# Patient Record
Sex: MALE | Race: WHITE | NOT HISPANIC OR LATINO | Employment: OTHER | URBAN - METROPOLITAN AREA
[De-identification: names, ages, dates, MRNs, and addresses within clinical notes are randomized per-mention and may not be internally consistent; named-entity substitution may affect disease eponyms.]

---

## 2017-03-17 ENCOUNTER — TELEPHONE (OUTPATIENT)
Dept: FAMILY MEDICINE | Facility: CLINIC | Age: 72
End: 2017-03-17

## 2017-03-17 NOTE — TELEPHONE ENCOUNTER
Panel Management Review    Patient has the following on his problem list:     Diabetes    ASA: Passed    Last A1C  Lab Results   Component Value Date    A1C 9.2 12/14/2016    A1C 10.1 07/21/2016    A1C 8.0 03/23/2016    A1C 11.8 01/07/2016    A1C >15.0 06/26/2015     A1C tested: FAILED    Last LDL:    Lab Results   Component Value Date    CHOL 210 12/14/2016     Lab Results   Component Value Date    HDL 45 12/14/2016     Lab Results   Component Value Date     12/14/2016     02/18/2015     Lab Results   Component Value Date    TRIG 125 12/14/2016     Lab Results   Component Value Date    CHOLHDLRATIO 4.0 06/06/2013     Lab Results   Component Value Date    NHDL 165 12/14/2016     Is the patient on a Statin? YES             Is the patient on Aspirin? YES    Medications     HMG CoA Reductase Inhibitors    atorvastatin (LIPITOR) 20 MG tablet    Salicylates    aspirin 81 MG tablet        Last three blood pressure readings:  BP Readings from Last 3 Encounters:   12/14/16 130/81   07/21/16 136/84   02/17/16 134/76     Date of last diabetes office visit: 12/14/16     Tobacco History:     History   Smoking Status     Never Smoker   Smokeless Tobacco     Never Used     Hypertension   Last three blood pressure readings:  BP Readings from Last 3 Encounters:   12/14/16 130/81   07/21/16 136/84   02/17/16 134/76     Blood pressure: Passed    HTN Guidelines:  Age 18-59 BP range:  Less than 140/90  Age 60-85 with Diabetes:  Less than 140/90  Age 60-85 without Diabetes:  less than 150/90      Composite cancer screening  Chart review shows that this patient is due/due soon for the following Colonoscopy  Summary:    Patient is due/failing the following:   A1C, COLONOSCOPY and FOLLOW UP    Action needed:   Patient needs office visit for diabetes follow up (nonfasting).    Type of outreach:    Phone, spoke to patient.  apt scheduled for 3/27/17 at 2:20pm    Questions for provider review:    None                                                                                    Dian Claudio LPN     Chart routed to self for review.

## 2017-03-27 ENCOUNTER — OFFICE VISIT (OUTPATIENT)
Dept: FAMILY MEDICINE | Facility: CLINIC | Age: 72
End: 2017-03-27
Payer: COMMERCIAL

## 2017-03-27 VITALS
SYSTOLIC BLOOD PRESSURE: 136 MMHG | TEMPERATURE: 98.6 F | BODY MASS INDEX: 28.64 KG/M2 | HEIGHT: 69 IN | DIASTOLIC BLOOD PRESSURE: 88 MMHG | WEIGHT: 193.4 LBS | HEART RATE: 88 BPM

## 2017-03-27 DIAGNOSIS — Z23 NEED FOR PNEUMOCOCCAL VACCINE: Primary | ICD-10-CM

## 2017-03-27 DIAGNOSIS — Z79.4 TYPE 2 DIABETES MELLITUS WITH DIABETIC NEUROPATHY, WITH LONG-TERM CURRENT USE OF INSULIN (H): ICD-10-CM

## 2017-03-27 DIAGNOSIS — E11.40 TYPE 2 DIABETES MELLITUS WITH DIABETIC NEUROPATHY, WITH LONG-TERM CURRENT USE OF INSULIN (H): ICD-10-CM

## 2017-03-27 LAB
HBA1C MFR BLD: 8.7 % (ref 4.3–6)
TSH SERPL DL<=0.005 MIU/L-ACNC: 1.37 MU/L (ref 0.4–4)

## 2017-03-27 PROCEDURE — 84443 ASSAY THYROID STIM HORMONE: CPT | Performed by: FAMILY MEDICINE

## 2017-03-27 PROCEDURE — 83036 HEMOGLOBIN GLYCOSYLATED A1C: CPT | Performed by: FAMILY MEDICINE

## 2017-03-27 PROCEDURE — 99214 OFFICE O/P EST MOD 30 MIN: CPT | Mod: 25 | Performed by: FAMILY MEDICINE

## 2017-03-27 PROCEDURE — G0009 ADMIN PNEUMOCOCCAL VACCINE: HCPCS | Performed by: FAMILY MEDICINE

## 2017-03-27 PROCEDURE — 90732 PPSV23 VACC 2 YRS+ SUBQ/IM: CPT | Performed by: FAMILY MEDICINE

## 2017-03-27 PROCEDURE — 36415 COLL VENOUS BLD VENIPUNCTURE: CPT | Performed by: FAMILY MEDICINE

## 2017-03-27 NOTE — PROGRESS NOTES
SUBJECTIVE:                                                    Ramo Adams is a 72 year old male who presents to clinic today for the following health issues:    Diabetes Follow-up    Patient is checking blood sugars: once daily.  Results are as follows:             Diabetic concerns: None     Symptoms of hypoglycemia (low blood sugar): none     Paresthesias (numbness or burning in feet) or sores: No     Date of last diabetic eye exam: Many years ago     Hyperlipidemia Follow-Up      Rate your low fat/cholesterol diet?: not monitoring fat    Taking statin?  Yes, no muscle aches from statin    Other lipid medications/supplements?:  none     Hypertension Follow-up      Outpatient blood pressures are not being checked.    Low Salt Diet: not monitoring salt         Amount of exercise or physical activity: None    Problems taking medications regularly: No    Medication side effects: none    Diet: regular (no restrictions)      Problem list and histories reviewed & adjusted, as indicated.  Additional history:     Patient Active Problem List   Diagnosis     Health Care Home     Hyperlipidemia LDL goal <100     Overweight     Type 2 diabetes mellitus with diabetic neuropathy (H)     Essential hypertension with goal blood pressure less than 130/85     Past Surgical History:   Procedure Laterality Date     SPECIMEN TO PATHOLOGY  2008    colon resection - 6-7 inches       Social History   Substance Use Topics     Smoking status: Never Smoker     Smokeless tobacco: Never Used     Alcohol use Yes      Comment: very rare - 1 beer once a month or less     Family History   Problem Relation Age of Onset     DIABETES Mother      Alzheimer Disease Father      DIABETES Sister      DIABETES Maternal Grandmother            ROS:  Constitutional, HEENT, cardiovascular, pulmonary, gi and gu systems are negative, except as otherwise noted.    OBJECTIVE:                                                    /88 (BP Location: Right  "arm, Patient Position: Chair, Cuff Size: Adult Large)  Pulse 88  Temp 98.6  F (37  C) (Tympanic)  Ht 5' 8.5\" (1.74 m)  Wt 193 lb 6.4 oz (87.7 kg)  BMI 28.98 kg/m2 Body mass index is 28.98 kg/(m^2).   GENERAL: healthy, alert, well nourished, well hydrated, no distress  HENT: ear canals- normal; TMs- normal; Nose- normal; Mouth- no ulcers, no lesions  NECK: no tenderness, no adenopathy, no asymmetry, no masses, no stiffness; thyroid- normal to palpation  RESP: lungs clear to auscultation - no rales, no rhonchi, no wheezes  CV: regular rates and rhythm, normal S1 S2, no S3 or S4 and no murmur, no click or rub -  ABDOMEN: soft, no tenderness, no  hepatosplenomegaly, no masses, normal bowel sounds       ASSESSMENT/PLAN:                                                      (E11.40) Type 2 diabetes mellitus with diabetic neuropathy (H)  (primary encounter diagnosis)   Lab Results   Component Value Date    A1C 8.7 03/27/2017    A1C 9.2 12/14/2016    A1C 10.1 07/21/2016    A1C 8.0 03/23/2016    A1C 11.8 01/07/2016   Poor control     Plan: Hemoglobin A1c, TSH with free T4 reflex       (Z23) Need for pneumococcal vaccine  Plan: PNEUMOCOCCAL VACCINE,ADULT,SQ OR IM, ADMIN         PNEUMOCOCCAL VACCINE    (E11.40,  Z79.4) Type 2 diabetes mellitus with diabetic neuropathy, with long-term current use of insulin (H)  sheila increase lantus. Call me i 1 week with blod sugar resutls  Plan: insulin glargine (LANTUS SOLOSTAR) 100 UNIT/ML         injection     reports that he has never smoked. He has never used smokeless tobacco.    Patient should return to clinic for office visit in 6 months.    Santiago Ko MD    [facility]    "

## 2017-03-27 NOTE — MR AVS SNAPSHOT
"              After Visit Summary   3/27/2017    Ramo Adams    MRN: [id number]           Patient Information     Date Of Birth          1945        Visit Information        Provider Department      3/27/2017 2:20 PM Santiago Ko MD [facility]        Today's Diagnoses     Need for pneumococcal vaccine    -  1    Type 2 diabetes mellitus with diabetic neuropathy, with long-term current use of insulin (H)           Follow-ups after your visit        Who to contact     Normal or non-critical lab and imaging results will be communicated to you by [employer], letter or phone within 4 business days after the clinic has received the results. If you do not hear from us within 7 days, please contact the clinic through [employer] or phone. If you have a critical or abnormal lab result, we will notify you by phone as soon as possible.  Submit refill requests through "[employer] or call your pharmacy and they will forward the refill request to us. Please allow 3 business days for your refill to be completed.          If you need to speak with a  for additional information , please call: [phone]             Additional Information About Your Visit        "[employer] Information     "[employer] lets you send messages to your doctor, view your test results, renew your prescriptions, schedule appointments and more. To sign up, go to [email or web address] . Click on \"Log in\" on the left side of the screen, which will take you to the Welcome page. Then click on \"Sign up Now\" on the right side of the page.     You will be asked to enter the access code listed below, as well as some personal information. Please follow the directions to create your username and password.     Your access code is: [email or web address]  Expires: 2017 12:09 PM     Your access code will  in 90 days. If you need help or a new code, please call your [facility] or [phone].        Care EveryWhere ID     This is your Care " "EveryWhere ID. This could be used by other organizations to access your [facility] medical records  [id number]        Your Vitals Were     Pulse Temperature Height BMI (Body Mass Index)          88 98.6  F (37  C) (Tympanic) 5' 8.5\" (1.74 m) 28.98 kg/m2         Blood Pressure from Last 3 Encounters:   03/27/17 136/88   12/14/16 130/81   07/21/16 136/84    Weight from Last 3 Encounters:   03/27/17 193 lb 6.4 oz (87.7 kg)   12/14/16 181 lb 3.2 oz (82.2 kg)   07/21/16 185 lb 9.6 oz (84.2 kg)              We Performed the Following     ADMIN PNEUMOCOCCAL VACCINE     Hemoglobin A1c     PNEUMOCOCCAL VACCINE,ADULT,SQ OR IM     TSH with free T4 reflex          Today's Medication Changes          These changes are accurate as of: 3/27/17 11:59 PM.  If you have any questions, ask your nurse or doctor.               Start taking these medicines.        Dose/Directions    insulin glargine 100 UNIT/ML injection   Commonly known as:  LANTUS SOLOSTAR   Used for:  Type 2 diabetes mellitus with diabetic neuropathy, with long-term current use of insulin (H)   Started by:  Santiago Ko MD        Dose:  20 Units   Inject 20 Units Subcutaneous 2 times daily   Quantity:  3 mL   Refills:  3            Where to get your medicines      These medications were sent to [facility] [address] [facility]  [address] [facility] [address]     Phone:  [phone]     insulin glargine 100 UNIT/ML injection                Primary Care Provider Office Phone # Fax #    Santiago Ko [phone]       [address] [address]        Thank you!     Thank you for choosing [facility]  for your care. Our goal is always to provide you with excellent care. Hearing back from our patients is one way we can continue to improve our services. Please take a few minutes to complete the written survey that you may receive in the mail after your visit with us. Thank " you!             Your Updated Medication List - Protect others around you: Learn how to safely use, store and throw away your medicines at www.disposemymeds.org.          This list is accurate as of: 3/27/17 11:59 PM.  Always use your most recent med list.                   Brand Name Dispense Instructions for use    aspirin 81 MG tablet      Take 1 tablet by mouth daily.       atorvastatin 20 MG tablet    LIPITOR    90 tablet    Take 1 tablet (20 mg) by mouth daily       blood glucose monitoring test strip    no brand specified    3 Box    Use to test blood sugars 2 times daily or as directed    Pt instructed to bring monitor with him to make sure he gets right strios.       CENTURY SENIOR PO      Take 1 tablet by mouth daily.       insulin glargine 100 UNIT/ML injection    LANTUS SOLOSTAR    3 mL    Inject 20 Units Subcutaneous 2 times daily       insulin pen needle 31G X 8 MM    B-D U/F    100 each    Use twice a day with lantus solostar pen       lisinopril 10 MG tablet    PRINIVIL/ZESTRIL    90 tablet    Take 1 tablet (10 mg) by mouth daily

## 2017-04-03 PROBLEM — E11.40 TYPE 2 DIABETES MELLITUS WITH DIABETIC NEUROPATHY, WITH LONG-TERM CURRENT USE OF INSULIN (H): Status: ACTIVE | Noted: 2017-04-03

## 2017-04-03 PROBLEM — Z79.4 TYPE 2 DIABETES MELLITUS WITH DIABETIC NEUROPATHY, WITH LONG-TERM CURRENT USE OF INSULIN (H): Status: ACTIVE | Noted: 2017-04-03

## 2017-06-26 DIAGNOSIS — E11.40 TYPE 2 DIABETES MELLITUS WITH DIABETIC NEUROPATHY, WITH LONG-TERM CURRENT USE OF INSULIN (H): Primary | ICD-10-CM

## 2017-06-26 DIAGNOSIS — Z79.4 TYPE 2 DIABETES MELLITUS WITH DIABETIC NEUROPATHY, WITH LONG-TERM CURRENT USE OF INSULIN (H): Primary | ICD-10-CM

## 2017-06-26 DIAGNOSIS — I10 ESSENTIAL HYPERTENSION WITH GOAL BLOOD PRESSURE LESS THAN 130/85: ICD-10-CM

## 2017-06-26 NOTE — TELEPHONE ENCOUNTER
LISINOPRIL 10MG TABS        Last Written Prescription Date: 12/14/16  Last Fill Quantity: 90, # refills: 1  [place] [email or web address] or [facility] prescribing provider: 3/27/17       Potassium   Date Value Ref Range Status   07/21/2016 4.3 3.4 - 5.3 mmol/L Final     Creatinine   Date Value Ref Range Status   07/21/2016 0.63 (L) 0.66 - 1.25 mg/dL Final     BP Readings from Last 3 Encounters:   03/27/17 136/88   12/14/16 130/81   07/21/16 136/84

## 2017-06-27 RX ORDER — LISINOPRIL 10 MG/1
TABLET ORAL
Qty: 90 TABLET | Refills: 0 | Status: SHIPPED | OUTPATIENT
Start: 2017-06-27 | End: 2017-08-23

## 2017-06-27 NOTE — TELEPHONE ENCOUNTER
Medication is being filled for 1 time refill only due to:  Patient needs labs bmp,microalbumin, hgba1c. Future labs ordered yes.   Jose Sanchez RN

## 2017-08-11 ENCOUNTER — TELEPHONE (OUTPATIENT)
Dept: FAMILY MEDICINE | Facility: CLINIC | Age: 72
End: 2017-08-11

## 2017-08-11 NOTE — TELEPHONE ENCOUNTER
Panel Management Review      Patient has the following on his problem list:     Diabetes    ASA: Passed    Last A1C  Lab Results   Component Value Date    A1C 8.7 03/27/2017    A1C 9.2 12/14/2016    A1C 10.1 07/21/2016    A1C 8.0 03/23/2016    A1C 11.8 01/07/2016     A1C tested: FAILED    Last LDL:    Lab Results   Component Value Date    CHOL 210 12/14/2016     Lab Results   Component Value Date    HDL 45 12/14/2016     Lab Results   Component Value Date     12/14/2016     Lab Results   Component Value Date    TRIG 125 12/14/2016     Lab Results   Component Value Date    CHOLHDLRATIO 4.0 06/06/2013     Lab Results   Component Value Date    NHDL 165 12/14/2016       Is the patient on a Statin? YES             Is the patient on Aspirin? YES    Medications     HMG CoA Reductase Inhibitors    atorvastatin (LIPITOR) 20 MG tablet    Salicylates    aspirin 81 MG tablet          Last three blood pressure readings:  BP Readings from Last 3 Encounters:   03/27/17 136/88   12/14/16 130/81   07/21/16 136/84       Date of last diabetes office visit: 02/17/2016     Tobacco History:     History   Smoking Status     Never Smoker   Smokeless Tobacco     Never Used         Hypertension   Last three blood pressure readings:  BP Readings from Last 3 Encounters:   03/27/17 136/88   12/14/16 130/81   07/21/16 136/84     Blood pressure: Passed    HTN Guidelines:  Age 18-59 BP range:  Less than 140/90  Age 60-85 with Diabetes:  Less than 140/90  Age 60-85 without Diabetes:  less than 150/90          Composite cancer screening  Chart review shows that this patient is due/due soon for the following Colonoscopy  Summary:    Patient is due/failing the following:   Diabetes, A1C and COLONOSCOPY    Action needed:   Patient needs office visit for a diabetic check. and Patient needs referral/order: colonoscopy    Type of outreach:    Phone, left message for patient to call back.     Questions for provider review:    None                                                                                                                                     Paulina Huang, [facility]       Chart routed to self.

## 2017-08-23 ENCOUNTER — OFFICE VISIT (OUTPATIENT)
Dept: FAMILY MEDICINE | Facility: CLINIC | Age: 72
End: 2017-08-23
Payer: COMMERCIAL

## 2017-08-23 ENCOUNTER — RADIANT APPOINTMENT (OUTPATIENT)
Dept: GENERAL RADIOLOGY | Facility: CLINIC | Age: 72
End: 2017-08-23
Attending: FAMILY MEDICINE
Payer: COMMERCIAL

## 2017-08-23 VITALS
DIASTOLIC BLOOD PRESSURE: 75 MMHG | HEIGHT: 69 IN | SYSTOLIC BLOOD PRESSURE: 123 MMHG | BODY MASS INDEX: 28.73 KG/M2 | WEIGHT: 194 LBS | TEMPERATURE: 97.9 F | HEART RATE: 70 BPM

## 2017-08-23 DIAGNOSIS — Z79.4 TYPE 2 DIABETES MELLITUS WITH DIABETIC NEUROPATHY, WITH LONG-TERM CURRENT USE OF INSULIN (H): Primary | ICD-10-CM

## 2017-08-23 DIAGNOSIS — Z12.11 SCREEN FOR COLON CANCER: ICD-10-CM

## 2017-08-23 DIAGNOSIS — R05.9 COUGH: ICD-10-CM

## 2017-08-23 DIAGNOSIS — I10 ESSENTIAL HYPERTENSION WITH GOAL BLOOD PRESSURE LESS THAN 130/85: ICD-10-CM

## 2017-08-23 DIAGNOSIS — E11.40 TYPE 2 DIABETES MELLITUS WITH DIABETIC NEUROPATHY, WITH LONG-TERM CURRENT USE OF INSULIN (H): Primary | ICD-10-CM

## 2017-08-23 DIAGNOSIS — Z23 NEED FOR PROPHYLACTIC VACCINATION AND INOCULATION AGAINST INFLUENZA: ICD-10-CM

## 2017-08-23 LAB
ANION GAP SERPL CALCULATED.3IONS-SCNC: 4 MMOL/L (ref 3–14)
BUN SERPL-MCNC: 18 MG/DL (ref 7–30)
CALCIUM SERPL-MCNC: 9.2 MG/DL (ref 8.5–10.1)
CHLORIDE SERPL-SCNC: 105 MMOL/L (ref 94–109)
CO2 SERPL-SCNC: 26 MMOL/L (ref 20–32)
CREAT SERPL-MCNC: 0.67 MG/DL (ref 0.66–1.25)
CREAT UR-MCNC: 37 MG/DL
GFR SERPL CREATININE-BSD FRML MDRD: >90 ML/MIN/1.7M2
GLUCOSE SERPL-MCNC: 159 MG/DL (ref 70–99)
HBA1C MFR BLD: 9.1 % (ref 4.3–6)
MICROALBUMIN UR-MCNC: <5 MG/L
MICROALBUMIN/CREAT UR: NORMAL MG/G CR (ref 0–17)
POTASSIUM SERPL-SCNC: 4.4 MMOL/L (ref 3.4–5.3)
SODIUM SERPL-SCNC: 135 MMOL/L (ref 133–144)

## 2017-08-23 PROCEDURE — 80048 BASIC METABOLIC PNL TOTAL CA: CPT | Performed by: FAMILY MEDICINE

## 2017-08-23 PROCEDURE — 36415 COLL VENOUS BLD VENIPUNCTURE: CPT | Performed by: FAMILY MEDICINE

## 2017-08-23 PROCEDURE — 83036 HEMOGLOBIN GLYCOSYLATED A1C: CPT | Performed by: FAMILY MEDICINE

## 2017-08-23 PROCEDURE — 99214 OFFICE O/P EST MOD 30 MIN: CPT | Performed by: FAMILY MEDICINE

## 2017-08-23 PROCEDURE — 71020 XR CHEST 2 VW: CPT

## 2017-08-23 PROCEDURE — 82043 UR ALBUMIN QUANTITATIVE: CPT | Performed by: FAMILY MEDICINE

## 2017-08-23 RX ORDER — LOSARTAN POTASSIUM 100 MG/1
100 TABLET ORAL DAILY
Qty: 90 TABLET | Refills: 3 | Status: SHIPPED | OUTPATIENT
Start: 2017-08-23 | End: 2018-07-16

## 2017-08-23 NOTE — PROGRESS NOTES
SUBJECTIVE:                                                    Ramo Adams is a 72 year old male who presents to clinic today for the following health issues:    Diabetes Follow-up    Patient is checking blood sugars: once daily.  Results are as follows:       suppertime - 114-120    Diabetic concerns: None     Symptoms of hypoglycemia (low blood sugar): none     Paresthesias (numbness or burning in feet) or sores: No     Date of last diabetic eye exam: has not had one recently.     Hyperlipidemia Follow-Up      Rate your low fat/cholesterol diet?: not monitoring fat    Taking statin?  No. He is prescribed one but he stopped it. He is not sure why he stopped it.    Other lipid medications/supplements?:  none    Hypertension Follow-up      Outpatient blood pressures are not being checked.  Low Salt Diet: Does not add a whole lot of salt to things      Amount of exercise or physical activity: 6-7 days/week for an average of all day. Very physically active job    Problems taking medications regularly: No    Medication side effects: none  Diet: regular (no restrictions)      Problem list and histories reviewed & adjusted, as indicated.  Additional history:     Patient Active Problem List   Diagnosis     Health Care Home     Hyperlipidemia LDL goal <100     Overweight     Essential hypertension with goal blood pressure less than 130/85     Type 2 diabetes mellitus with diabetic neuropathy, with long-term current use of insulin (H)     Past Surgical History:   Procedure Laterality Date     SPECIMEN TO PATHOLOGY  2008    colon resection - 6-7 inches       Social History   Substance Use Topics     Smoking status: Never Smoker     Smokeless tobacco: Never Used     Alcohol use Yes      Comment: very rare - 1 beer once a month or less     Family History   Problem Relation Age of Onset     DIABETES Mother      Alzheimer Disease Father      DIABETES Sister      DIABETES Maternal Grandmother              ROS:  Constitutional,  "HEENT, cardiovascular, pulmonary, gi and gu systems are negative, except as otherwise noted.      OBJECTIVE:                                                    /75 (BP Location: Right arm, Patient Position: Sitting, Cuff Size: Adult Large)  Pulse 70  Temp 97.9  F (36.6  C) (Tympanic)  Ht 5' 8.5\" (1.74 m)  Wt 194 lb (88 kg)  BMI 29.07 kg/m2 Body mass index is 29.07 kg/(m^2).   GENERAL: healthy, alert, well nourished, well hydrated, no distress  HENT: ear canals- normal; TMs- normal; Nose- normal; Mouth- no ulcers, no lesions  NECK: no tenderness, no adenopathy, no asymmetry, no masses, no stiffness; thyroid- normal to palpation  RESP: lungs clear to auscultation - no rales, no rhonchi, no wheezes  CV: regular rates and rhythm, normal S1 S2, no S3 or S4 and no murmur, no click or rub -  ABDOMEN: soft, no tenderness, no  hepatosplenomegaly, no masses, normal bowel sounds       ASSESSMENT/PLAN:                                                    Cough: hacky cough after having cold.   Most likely fro ace inhib    (E11.40,  Z79.4) Type 2 diabetes mellitus with diabetic neuropathy, with long-term current use of insulin (H)  (primary encounter diagnosis)    Poor control  Check sugars regularly call me in 1 week with glucose results     Lab Results   Component Value Date    A1C 9.1 08/23/2017    A1C 8.7 03/27/2017    A1C 9.2 12/14/2016    A1C 10.1 07/21/2016    A1C 8.0 03/23/2016     (E11.40,  Z79.4) Type 2 diabetes mellitus with diabetic neuropathy, with long-term current use of insulin (H)  (primary encounter diagnosis)  Plan: insulin glargine (LANTUS SOLOSTAR) 100 UNIT/ML       (I10) Essential hypertension with goal blood pressure less than 130/8  Plan: losartan (COZAAR) 100 MG tablet    (R05) Cough  Plan: XR Chest 2 Views I independently visualized the xray:       (Z23) Need for prophylactic vaccination and inoculation against influenza    (I10) Essential hypertension with goal blood pressure less than " 130/85  Will switch from ace to arb.     reports that he has never smoked. He has never used smokeless tobacco.      [facility]

## 2017-08-23 NOTE — MR AVS SNAPSHOT
"              After Visit Summary   8/23/2017    Ramo Adams    MRN: [id number]           Patient Information     Date Of Birth          1945        Visit Information        Provider Department      8/23/2017 9:00 AM Santiago Ko MD [facility]        Today's Diagnoses     Type 2 diabetes mellitus with diabetic neuropathy, with long-term current use of insulin (H)    -  1    Screen for colon cancer        Need for prophylactic vaccination and inoculation against influenza        Essential hypertension with goal blood pressure less than 130/85        Cough           Follow-ups after your visit        Who to contact     Normal or non-critical lab and imaging results will be communicated to you by sarvaMAILhart, letter or phone within 4 business days after the clinic has received the results. If you do not hear from us within 7 days, please contact the clinic through MedEncentivet or phone. If you have a critical or abnormal lab result, we will notify you by phone as soon as possible.  Submit refill requests through Vibrant Media or call your pharmacy and they will forward the refill request to us. Please allow 3 business days for your refill to be completed.          If you need to speak with a  for additional information , please call: [phone]             Additional Information About Your Visit        Vibrant Media Information     Vibrant Media lets you send messages to your doctor, view your test results, renew your prescriptions, schedule appointments and more. To sign up, go to [email or web address] . Click on \"Log in\" on the left side of the screen, which will take you to the Welcome page. Then click on \"Sign up Now\" on the right side of the page.     You will be asked to enter the access code listed below, as well as some personal information. Please follow the directions to create your username and password.     Your access code is: [email or web address]  Expires: 11/26/2017  5:03 PM     Your access code " "will  in 90 days. If you need help or a new code, please call your [place] clinic or [phone].        Care EveryWhere ID     This is your Care EveryWhere ID. This could be used by other organizations to access your [place] medical records  [id number]        Your Vitals Were     Pulse Temperature Height BMI (Body Mass Index)          70 97.9  F (36.6  C) (Tympanic) 5' 8.5\" (1.74 m) 29.07 kg/m2         Blood Pressure from Last 3 Encounters:   17 123/75   17 136/88   16 130/81    Weight from Last 3 Encounters:   17 194 lb (88 kg)   17 193 lb 6.4 oz (87.7 kg)   16 181 lb 3.2 oz (82.2 kg)              We Performed the Following     Albumin Random Urine Quantitative     Basic metabolic panel     Hemoglobin A1c          Today's Medication Changes          These changes are accurate as of: 17 11:59 PM.  If you have any questions, ask your nurse or doctor.               Start taking these medicines.        Dose/Directions    losartan 100 MG tablet   Commonly known as:  COZAAR   Used for:  Essential hypertension with goal blood pressure less than 130/85   Started by:  Santiago Ko MD        Dose:  100 mg   Take 1 tablet (100 mg) by mouth daily   Quantity:  90 tablet   Refills:  3         Stop taking these medicines if you haven't already. Please contact your care team if you have questions.     lisinopril 10 MG tablet   Commonly known as:  PRINIVIL/ZESTRIL   Stopped by:  Santiago Ko MD                Where to get your medicines      These medications were sent to [facility]  [address] - [address]  [address] [facility] [address]     Phone:  [phone]     insulin glargine 100 UNIT/ML injection    losartan 100 MG tablet                Primary Care Provider Office Phone # Fax #    Santiago Ko [phone]       [facility] [address] [facility] [address]        Equal Access to Services     " ILDA SOUZA : Hadii aad ku janice Schuster, waaxda luqadaha, qaybta kaalmada adealetha, darlene iris haykaylen larsonerrolpatrice bergeron . So [facility] [place] [phone].    ATENCIÓN: Si habla español, tiene a butcher disposición servicios gratuitos de asistencia lingüística. Llame al [phone].    We comply with applicable federal civil rights laws and [place] laws. We do not discriminate on the basis of race, color, national origin, age, disability sex, sexual orientation or gender identity.            Thank you!     Thank you for choosing [facility]  for your care. Our goal is always to provide you with excellent care. Hearing back from our patients is one way we can continue to improve our services. Please take a few minutes to complete the written survey that you may receive in the mail after your visit with us. Thank you!             Your Updated Medication List - Protect others around you: Learn how to safely use, store and throw away your medicines at www.disposemymeds.org.          This list is accurate as of: 8/23/17 11:59 PM.  Always use your most recent med list.                   Brand Name Dispense Instructions for use Diagnosis    aspirin 81 MG tablet      Take 1 tablet by mouth daily.        atorvastatin 20 MG tablet    LIPITOR    90 tablet    Take 1 tablet (20 mg) by mouth daily    Hyperlipidemia LDL goal <100       blood glucose monitoring test strip    no brand specified    3 Box    Use to test blood sugars 2 times daily or as directed    Pt instructed to bring monitor with him to make sure he gets right strios.    Type 2 diabetes mellitus with diabetic neuropathy, with long-term current use of insulin (H)       CENTURY SENIOR PO      Take 1 tablet by mouth daily.        insulin glargine 100 UNIT/ML injection    LANTUS SOLOSTAR    3 mL    Inject 20 Units Subcutaneous 2 times daily    Type 2 diabetes mellitus with diabetic neuropathy, with long-term current use of insulin (H)       insulin pen needle  31G X 8 MM    B-D U/F    100 each    Use twice a day with lantus solostar pen    Type 2 diabetes mellitus with diabetic neuropathy, with long-term current use of insulin (H)       losartan 100 MG tablet    COZAAR    90 tablet    Take 1 tablet (100 mg) by mouth daily    Essential hypertension with goal blood pressure less than 130/85

## 2017-08-23 NOTE — NURSING NOTE
"Chief Complaint   Patient presents with     Diabetes       Initial /75 (BP Location: Right arm, Patient Position: Sitting, Cuff Size: Adult Large)  Pulse 70  Temp 97.9  F (36.6  C) (Tympanic)  Ht 5' 8.5\" (1.74 m)  Wt 194 lb (88 kg)  BMI 29.07 kg/m2 Estimated body mass index is 29.07 kg/(m^2) as calculated from the following:    Height as of this encounter: 5' 8.5\" (1.74 m).    Weight as of this encounter: 194 lb (88 kg).  Medication Reconciliation: complete   Paulina Huang CMA  "

## 2017-11-10 ENCOUNTER — TELEPHONE (OUTPATIENT)
Dept: FAMILY MEDICINE | Facility: CLINIC | Age: 72
End: 2017-11-10

## 2017-11-10 NOTE — TELEPHONE ENCOUNTER
11/10/2017    Call Regarding Preventive Health Screening Colonoscopy    Attempt 3    Message on voicemail     Comments:       Outreach   MG

## 2017-12-07 ENCOUNTER — TELEPHONE (OUTPATIENT)
Dept: FAMILY MEDICINE | Facility: CLINIC | Age: 72
End: 2017-12-07

## 2017-12-07 NOTE — TELEPHONE ENCOUNTER
Panel Management Review      Patient has the following on his problem list:     Diabetes    ASA: Passed    Last A1C  Lab Results   Component Value Date    A1C 9.1 08/23/2017    A1C 8.7 03/27/2017    A1C 9.2 12/14/2016    A1C 10.1 07/21/2016    A1C 8.0 03/23/2016     A1C tested: FAILED    Last LDL:    Lab Results   Component Value Date    CHOL 210 12/14/2016     Lab Results   Component Value Date    HDL 45 12/14/2016     Lab Results   Component Value Date     12/14/2016     Lab Results   Component Value Date    TRIG 125 12/14/2016     Lab Results   Component Value Date    CHOLHDLRATIO 4.0 06/06/2013     Lab Results   Component Value Date    NHDL 165 12/14/2016       Is the patient on a Statin? YES             Is the patient on Aspirin? YES    Medications     HMG CoA Reductase Inhibitors    atorvastatin (LIPITOR) 20 MG tablet    Salicylates    aspirin 81 MG tablet          Last three blood pressure readings:  BP Readings from Last 3 Encounters:   08/23/17 123/75   03/27/17 136/88   12/14/16 130/81       Date of last diabetes office visit: 08/23/2017     Tobacco History:     History   Smoking Status     Never Smoker   Smokeless Tobacco     Never Used         Hypertension   Last three blood pressure readings:  BP Readings from Last 3 Encounters:   08/23/17 123/75   03/27/17 136/88   12/14/16 130/81     Blood pressure: Passed    HTN Guidelines:  Age 18-59 BP range:  Less than 140/90  Age 60-85 with Diabetes:  Less than 140/90  Age 60-85 without Diabetes:  less than 150/90          Composite cancer screening  Chart review shows that this patient is due/due soon for the following Colonoscopy  Summary:    Patient is due/failing the following:   diabetes, A1C and LDL    Action needed:   Patient needs office visit for a diabetic check., Patient needs fasting lab only appointment and Patient needs referral/order: colonoscopy    Type of outreach:    Phone, spoke to patient.  Spoke to patients wife. He will call back  and schedule appointment.    Questions for provider review:    None                                                                                                                                    Paulina Huang CMA       Chart routed to none.

## 2017-12-20 DIAGNOSIS — Z79.4 TYPE 2 DIABETES MELLITUS WITH DIABETIC NEUROPATHY, WITH LONG-TERM CURRENT USE OF INSULIN (H): ICD-10-CM

## 2017-12-20 DIAGNOSIS — E11.40 TYPE 2 DIABETES MELLITUS WITH DIABETIC NEUROPATHY, WITH LONG-TERM CURRENT USE OF INSULIN (H): ICD-10-CM

## 2017-12-21 ENCOUNTER — OFFICE VISIT (OUTPATIENT)
Dept: FAMILY MEDICINE | Facility: CLINIC | Age: 72
End: 2017-12-21
Payer: COMMERCIAL

## 2017-12-21 VITALS
TEMPERATURE: 98.3 F | SYSTOLIC BLOOD PRESSURE: 136 MMHG | DIASTOLIC BLOOD PRESSURE: 80 MMHG | WEIGHT: 194 LBS | HEIGHT: 69 IN | HEART RATE: 69 BPM | BODY MASS INDEX: 28.73 KG/M2

## 2017-12-21 DIAGNOSIS — Z79.4 TYPE 2 DIABETES MELLITUS WITH DIABETIC NEUROPATHY, WITH LONG-TERM CURRENT USE OF INSULIN (H): Primary | ICD-10-CM

## 2017-12-21 DIAGNOSIS — Z23 NEED FOR PROPHYLACTIC VACCINATION AND INOCULATION AGAINST INFLUENZA: ICD-10-CM

## 2017-12-21 DIAGNOSIS — E11.40 TYPE 2 DIABETES MELLITUS WITH DIABETIC NEUROPATHY, WITH LONG-TERM CURRENT USE OF INSULIN (H): Primary | ICD-10-CM

## 2017-12-21 LAB
CHOLEST SERPL-MCNC: 179 MG/DL
HBA1C MFR BLD: 9.6 % (ref 4.3–6)
HDLC SERPL-MCNC: 44 MG/DL
LDLC SERPL CALC-MCNC: 112 MG/DL
NONHDLC SERPL-MCNC: 135 MG/DL
TRIGL SERPL-MCNC: 116 MG/DL

## 2017-12-21 PROCEDURE — 83036 HEMOGLOBIN GLYCOSYLATED A1C: CPT | Performed by: FAMILY MEDICINE

## 2017-12-21 PROCEDURE — 80061 LIPID PANEL: CPT | Performed by: FAMILY MEDICINE

## 2017-12-21 PROCEDURE — 36415 COLL VENOUS BLD VENIPUNCTURE: CPT | Performed by: FAMILY MEDICINE

## 2017-12-21 PROCEDURE — 99214 OFFICE O/P EST MOD 30 MIN: CPT | Mod: 25 | Performed by: FAMILY MEDICINE

## 2017-12-21 PROCEDURE — 90471 IMMUNIZATION ADMIN: CPT | Performed by: FAMILY MEDICINE

## 2017-12-21 PROCEDURE — 90662 IIV NO PRSV INCREASED AG IM: CPT | Performed by: FAMILY MEDICINE

## 2017-12-21 RX ORDER — GLIPIZIDE 5 MG/1
5 TABLET ORAL
Qty: 90 TABLET | Refills: 1 | Status: SHIPPED | OUTPATIENT
Start: 2017-12-21 | End: 2018-07-13

## 2017-12-21 NOTE — MR AVS SNAPSHOT
"              After Visit Summary   2017    Ramo Adams    MRN: [id number]           Patient Information     Date Of Birth          1945        Visit Information        Provider Department      2017 8:20 AM Santiago Ko MD [facility]        Today's Diagnoses     Type 2 diabetes mellitus with diabetic neuropathy, with long-term current use of insulin (H)    -  1    Need for prophylactic vaccination and inoculation against influenza           Follow-ups after your visit        Who to contact     Normal or non-critical lab and imaging results will be communicated to you by Move In Historyhart, letter or phone within 4 business days after the clinic has received the results. If you do not hear from us within 7 days, please contact the clinic through Move In Historyhart or phone. If you have a critical or abnormal lab result, we will notify you by phone as soon as possible.  Submit refill requests through Talkdesk or call your pharmacy and they will forward the refill request to us. Please allow 3 business days for your refill to be completed.          If you need to speak with a  for additional information , please call: [phone]             Additional Information About Your Visit        MyCharChannel Breeze Information     Talkdesk lets you send messages to your doctor, view your test results, renew your prescriptions, schedule appointments and more. To sign up, go to [email or web address] . Click on \"Log in\" on the left side of the screen, which will take you to the Welcome page. Then click on \"Sign up Now\" on the right side of the page.     You will be asked to enter the access code listed below, as well as some personal information. Please follow the directions to create your username and password.     Your access code is: [email or web address]  Expires: 3/21/2018 12:46 PM     Your access code will  in 90 days. If you need help or a new code, please call your [facility] or [phone].      " "  Care EveryWhere ID     This is your Care EveryWhere ID. This could be used by other organizations to access your [place] medical records  [id number]        Your Vitals Were     Pulse Temperature Height BMI (Body Mass Index)          69 98.3  F (36.8  C) (Tympanic) 5' 8.5\" (1.74 m) 29.07 kg/m2         Blood Pressure from Last 3 Encounters:   12/21/17 136/80   08/23/17 123/75   03/27/17 136/88    Weight from Last 3 Encounters:   12/21/17 194 lb (88 kg)   08/23/17 194 lb (88 kg)   03/27/17 193 lb 6.4 oz (87.7 kg)              We Performed the Following     FLU VACCINE, INCREASED ANTIGEN, PRESV FREE, AGE 65+ [85342]     Hemoglobin A1c     Lipid panel reflex to direct LDL Fasting          Today's Medication Changes          These changes are accurate as of: 12/21/17 12:46 PM.  If you have any questions, ask your nurse or doctor.               Start taking these medicines.        Dose/Directions    glipiZIDE 5 MG tablet   Commonly known as:  GLUCOTROL   Used for:  Type 2 diabetes mellitus with diabetic neuropathy, with long-term current use of insulin (H)   Started by:  Santiago Ko MD        Dose:  5 mg   Take 1 tablet (5 mg) by mouth daily (with dinner)   Quantity:  90 tablet   Refills:  1            Where to get your medicines      These medications were sent to [facility] [address]  [address] [facility] [address]     Phone:  [phone]     glipiZIDE 5 MG tablet                Primary Care Provider Office Phone # Fax #    Santiago Ko [phone]       [facility] [address] [facility] [address]        Equal Access to Services     JARETH SOUAZ AH: Hadii mariluz camacho Sojose, waaxda luqadaha, qaybta kaalmada adeegyada, darlene gonzalez. So [facility] [phone].    ATENCIÓN: Si habla español, tiene a butcher disposición servicios gratuitos de asistencia lingüística. Llame al [phone].    We comply with " applicable federal civil rights laws and [place] laws. We do not discriminate on the basis of race, color, national origin, age, disability, sex, sexual orientation, or gender identity.            Thank you!     Thank you for choosing [facility]  for your care. Our goal is always to provide you with excellent care. Hearing back from our patients is one way we can continue to improve our services. Please take a few minutes to complete the written survey that you may receive in the mail after your visit with us. Thank you!             Your Updated Medication List - Protect others around you: Learn how to safely use, store and throw away your medicines at www.disposemymeds.org.          This list is accurate as of: 12/21/17 12:46 PM.  Always use your most recent med list.                   Brand Name Dispense Instructions for use Diagnosis    aspirin 81 MG tablet      Take 1 tablet by mouth daily.        atorvastatin 20 MG tablet    LIPITOR    90 tablet    Take 1 tablet (20 mg) by mouth daily    Hyperlipidemia LDL goal <100       blood glucose monitoring test strip    no brand specified    3 Box    Use to test blood sugars 2 times daily or as directed    Pt instructed to bring monitor with him to make sure he gets right strios.    Type 2 diabetes mellitus with diabetic neuropathy, with long-term current use of insulin (H)       CENTURY SENIOR PO      Take 1 tablet by mouth daily.        glipiZIDE 5 MG tablet    GLUCOTROL    90 tablet    Take 1 tablet (5 mg) by mouth daily (with dinner)    Type 2 diabetes mellitus with diabetic neuropathy, with long-term current use of insulin (H)       insulin glargine 100 UNIT/ML injection    LANTUS SOLOSTAR    3 mL    Inject 20 Units Subcutaneous 2 times daily    Type 2 diabetes mellitus with diabetic neuropathy, with long-term current use of insulin (H)       insulin pen needle 31G X 8 MM    B-D U/F    100 each    Use twice a day with lantus solostar pen    Type 2  diabetes mellitus with diabetic neuropathy, with long-term current use of insulin (H)       losartan 100 MG tablet    COZAAR    90 tablet    Take 1 tablet (100 mg) by mouth daily    Essential hypertension with goal blood pressure less than 130/85

## 2017-12-21 NOTE — PROGRESS NOTES
SUBJECTIVE:   Ramo Adams is a 72 year old male who presents to clinic today for the following health issues:    Has blood sugar reading which all are great and all are just before supper. He is very active during the day, then will eat a large supper and go to bed.   I suspect his am fasting glucose is quit high.    Diabetes Follow-up    Patient is checking blood sugars: once daily.  Results are as follows:       suppertime - 100's        Diabetic concerns: None     Symptoms of hypoglycemia (low blood sugar): Symptoms occur if sugars < 90.     Paresthesias (numbness or burning in feet) or sores: No     Date of last diabetic eye exam: due  BP Readings from Last 2 Encounters:   12/21/17 151/86   08/23/17 123/75     Hemoglobin A1C (%)   Date Value   12/21/2017 9.6 (H)   08/23/2017 9.1 (H)     LDL Cholesterol Calculated (mg/dL)   Date Value   12/14/2016 140 (H)   01/07/2016 108 (H)         Amount of exercise or physical activity: 6-7 days/week for an average of greater than 60 minutes    Problems taking medications regularly: No    Medication side effects: none    Diet: regular (no restrictions)            Problem list and histories reviewed & adjusted, as indicated.  Additional history: as documented    Patient Active Problem List   Diagnosis     Health Care Home     Hyperlipidemia LDL goal <100     Overweight     Essential hypertension with goal blood pressure less than 130/85     Type 2 diabetes mellitus with diabetic neuropathy, with long-term current use of insulin (H)     Past Surgical History:   Procedure Laterality Date     SPECIMEN TO PATHOLOGY  2008    colon resection - 6-7 inches       Social History   Substance Use Topics     Smoking status: Never Smoker     Smokeless tobacco: Never Used     Alcohol use Yes      Comment: very rare - 1 beer once a month or less     Family History   Problem Relation Age of Onset     DIABETES Mother      Alzheimer Disease Father      DIABETES Sister      DIABETES Maternal  "Grandmother              Reviewed and updated as needed this visit by clinical staffTobacco  Allergies  Meds  Med Hx  Surg Hx  Fam Hx  Soc Hx      Reviewed and updated as needed this visit by Provider         ROS:  Constitutional, HEENT, cardiovascular, pulmonary, gi and gu systems are negative, except as otherwise noted.      OBJECTIVE:   /86 (BP Location: Right arm, Cuff Size: Adult Large)  Pulse 69  Temp 98.3  F (36.8  C) (Tympanic)  Ht 5' 8.5\" (1.74 m)  Wt 194 lb (88 kg)  BMI 29.07 kg/m2  Body mass index is 29.07 kg/(m^2).  GENERAL: healthy, alert and no distress  NECK: no adenopathy, no asymmetry, masses, or scars and thyroid normal to palpation  RESP: lungs clear to auscultation - no rales, rhonchi or wheezes  CV: regular rate and rhythm, normal S1 S2, no S3 or S4, no murmur, click or rub, no peripheral edema and peripheral pulses strong  ABDOMEN: soft, nontender, no hepatosplenomegaly, no masses and bowel sounds normal  MS: no gross musculoskeletal defects noted, no edema    Diagnostic Test Results:  none     ASSESSMENT/PLAN:     1. Type 2 diabetes mellitus with diabetic neuropathy, with long-term current use of insulin (H)  Poor contyrol has great evening blood sugars but elevated a1c  Suspect it is related to when zuleima javier is cheacking and his eatting and activity.  jhe will check in am. Add glipizide 5mg short acting to pm meal  - Hemoglobin A1c  - Lipid panel reflex to direct LDL Fasting  - glipiZIDE (GLUCOTROL) 5 MG tablet; Take 1 tablet (5 mg) by mouth daily (with dinner)  Dispense: 90 tablet; Refill: 1    2. Need for prophylactic vaccination and inoculation against influenza  - FLU VACCINE, INCREASED ANTIGEN, PRESV FREE, AGE 65+ [[address]48]    Santiago Ko MD  [facility]  "

## 2017-12-21 NOTE — PROGRESS NOTES
Injectable Influenza Immunization Documentation    1.  Is the person to be vaccinated sick today?   No    2. Does the person to be vaccinated have an allergy to a component   of the vaccine?   No  Egg Allergy Algorithm Link    3. Has the person to be vaccinated ever had a serious reaction   to influenza vaccine in the past?   No    4. Has the person to be vaccinated ever had Guillain-Barré syndrome?   No    Form completed by Sabi Devlin / Certified Medical Assistant......12/21/2017 9:53 AM

## 2017-12-27 RX ORDER — METHYLPREDNISOLONE SODIUM SUCCINATE 125 MG/2ML
INJECTION, POWDER, FOR SOLUTION INTRAMUSCULAR; INTRAVENOUS
Qty: 200 EACH | Refills: 3 | Status: SHIPPED | OUTPATIENT
Start: 2017-12-27 | End: 2018-07-16

## 2017-12-27 NOTE — TELEPHONE ENCOUNTER
Prescription approved per [facility] Refill Protocol.  Vanna Jhaveri RN

## 2018-01-10 ENCOUNTER — TELEPHONE (OUTPATIENT)
Dept: FAMILY MEDICINE | Facility: CLINIC | Age: 73
End: 2018-01-10

## 2018-01-10 NOTE — TELEPHONE ENCOUNTER
Reason for Call:  Other Blood sugar numbers    Detailed comments: Ho calling in b/s number starting in January, 2018    1/1 A.m.. 115  P.M..  117  1/2 A.M.     108  P.M.   120  1/3 A.M.     115   P.M.   107  1/5 A.M.     110  P.M.   100  1/7 A.M.     115  P.M.   120  1/8 A.M.     114  P.M.   92  1/9       A.M.     105  P.M.   92  1/10 A.M.     92    Please review and advise if needed.     Phone Number Patient can be reached at: Home number on file [phone] (home)    Best Time: He will be out of town on 1/10 afternoon and 1/11 all day.  If needed call him back on 1/12/18.      Can we leave a detailed message on this number? YES    Call taken on 1/10/2018 at 1:26 PM by Sary Rodriguez

## 2018-02-09 NOTE — TELEPHONE ENCOUNTER
Wife notified and will send in blood sugars a month from today 2/9/18.    Thank you..Sary Rodriguez

## 2018-03-08 ENCOUNTER — TELEPHONE (OUTPATIENT)
Dept: FAMILY MEDICINE | Facility: CLINIC | Age: 73
End: 2018-03-08

## 2018-03-08 NOTE — TELEPHONE ENCOUNTER
Ramo calling with blood sugar numbers for past 2 weeks.  Has more, but he came me from 2/26 to present.    2/26/18:   AM  114 PM  90  2/27/18:   AM   84 PM  104  2/28/18:   AM   112 PM    -  3/1/18:   AM   110 PM   105  3/2/18:  AM   94 PM   88  3/3/18:   AM   80 PM   106  3/4/18:  AM  108 PM   87  3/5/18:   AM  100 PM    -  3/6/18:  AM  95  PM   92  3/7/18:  AM  85  PM   102  3/8/18  AM  95    Would you like him to continue taking and reporting to you every month?  Please review and advise.  Thank you..Sary Rodriguez

## 2018-03-09 ENCOUNTER — TELEPHONE (OUTPATIENT)
Dept: FAMILY MEDICINE | Facility: CLINIC | Age: 73
End: 2018-03-09

## 2018-03-09 NOTE — TELEPHONE ENCOUNTER
Panel Management Review      Patient has the following on his problem list:     Diabetes    ASA: Passed    Last A1C  Lab Results   Component Value Date    A1C 9.6 12/21/2017    A1C 9.1 08/23/2017    A1C 8.7 03/27/2017    A1C 9.2 12/14/2016    A1C 10.1 07/21/2016     A1C tested: FAILED    Last LDL:    Lab Results   Component Value Date    CHOL 179 12/21/2017     Lab Results   Component Value Date    HDL 44 12/21/2017     Lab Results   Component Value Date     12/21/2017     Lab Results   Component Value Date    TRIG 116 12/21/2017     Lab Results   Component Value Date    CHOLHDLRATIO 4.0 06/06/2013     Lab Results   Component Value Date    NHDL 135 12/21/2017       Is the patient on a Statin? YES             Is the patient on Aspirin? YES    Medications     HMG CoA Reductase Inhibitors    atorvastatin (LIPITOR) 20 MG tablet    Salicylates    aspirin 81 MG tablet          Last three blood pressure readings:  BP Readings from Last 3 Encounters:   12/21/17 136/80   08/23/17 123/75   03/27/17 136/88     Date of last diabetes office visit: 12/21/2017     Tobacco History:     History   Smoking Status     Never Smoker   Smokeless Tobacco     Never Used         Hypertension   Last three blood pressure readings:  BP Readings from Last 3 Encounters:   12/21/17 136/80   08/23/17 123/75   03/27/17 136/88     Blood pressure: Passed    HTN Guidelines:  Age 18-59 BP range:  Less than 140/90  Age 60-85 with Diabetes:  Less than 140/90  Age 60-85 without Diabetes:  less than 150/90      Composite cancer screening  Chart review shows that this patient is due/due soon for the following Colonoscopy  Summary:    Patient is due/failing the following:   A1C and COLONOSCOPY    Action needed:   Patient needs non-fasting lab only appointment    Type of outreach:    Called and phone greta julien. Please recall.    Questions for provider review:    None                                                                                                                                     Paulina Huang, [facility]     Chart routed to self.

## 2018-03-21 NOTE — TELEPHONE ENCOUNTER
Message from Dr. Ko given to patient's wife Patricia (consent to communicate on file).    Zarina [place]   Clinic Station

## 2018-05-29 DIAGNOSIS — E11.40 TYPE 2 DIABETES MELLITUS WITH DIABETIC NEUROPATHY, WITH LONG-TERM CURRENT USE OF INSULIN (H): ICD-10-CM

## 2018-05-29 DIAGNOSIS — Z79.4 TYPE 2 DIABETES MELLITUS WITH DIABETIC NEUROPATHY, WITH LONG-TERM CURRENT USE OF INSULIN (H): ICD-10-CM

## 2018-05-29 NOTE — TELEPHONE ENCOUNTER
"LANTUS SOLOSTAR 100UNIT/ML SOPN        Last Written Prescription Date:  8/23/17  Last Fill Quantity: 3,   # refills: 3  Last Office Visit: 12/21/17  Future Office visit:       Requested Prescriptions   Pending Prescriptions Disp Refills     LANTUS SOLOSTAR 100 UNIT/ML soln [Pharmacy Med Name: LANTUS SOLOSTAR 100UNIT/ML SOPN] 15 mL 3     Sig: INJECT 20 UNITS UNDER THE SKIN TWO TIMES A DAY    Long Acting Insulin Protocol Failed    5/29/2018  9:30 AM       Failed - HgbA1C in past 3 or 6 months    If HgbA1C is 8 or greater, it needs to be on file within the past 3 months.  If less than 8, must be on file within the past 6 months.     Recent Labs   Lab Test  12/21/17   0838   A1C  9.6*            Passed - Blood pressure less than 140/90 in past 6 months    BP Readings from Last 3 Encounters:   12/21/17 136/80   08/23/17 123/75   03/27/17 136/88                Passed - LDL on file in past 12 months    Recent Labs   Lab Test  12/21/17   0838   LDL  112*            Passed - Microalbumin on file in past 12 months    Recent Labs   Lab Test  08/23/17   0913   MICROL  <5   UMALCR  Unable to calculate due to low value            Passed - Serum creatinine on file in past 12 months    Recent Labs   Lab Test  08/23/17   0906   CR  0.67            Passed - Patient is age 18 or older       Passed - Recent (6 mo) or future (30 days) visit within the authorizing provider's specialty    Patient had office visit in the last 6 months or has a visit in the next 30 days with authorizing provider or within the authorizing provider's specialty.  See \"Patient Info\" tab in inbasket, or \"Choose Columns\" in Meds & Orders section of the refill encounter.              "

## 2018-07-13 DIAGNOSIS — E11.40 TYPE 2 DIABETES MELLITUS WITH DIABETIC NEUROPATHY, WITH LONG-TERM CURRENT USE OF INSULIN (H): ICD-10-CM

## 2018-07-13 DIAGNOSIS — Z79.4 TYPE 2 DIABETES MELLITUS WITH DIABETIC NEUROPATHY, WITH LONG-TERM CURRENT USE OF INSULIN (H): ICD-10-CM

## 2018-07-13 NOTE — TELEPHONE ENCOUNTER
"Requested Prescriptions   Pending Prescriptions Disp Refills     LANTUS SOLOSTAR 100 UNIT/ML soln [Pharmacy Med Name: LANTUS SOLOSTAR 100UNIT/ML SOPN] 15 mL 0     Sig: INJECT 20 UNITS UNDER THE SKIN TWICE DAILY (FOLLOW UP APPOINTMENT NEEDED FOR REFILLS)    Long Acting Insulin Protocol Failed    7/13/2018 10:26 AM       Failed - Blood pressure less than 140/90 in past 6 months    BP Readings from Last 3 Encounters:   12/21/17 136/80   08/23/17 123/75   03/27/17 136/88                Failed - HgbA1C in past 3 or 6 months    If HgbA1C is 8 or greater, it needs to be on file within the past 3 months.  If less than 8, must be on file within the past 6 months.     Recent Labs   Lab Test  12/21/17   0838   A1C  9.6*            Failed - Recent (6 mo) or future (30 days) visit within the authorizing provider's specialty    Patient had office visit in the last 6 months or has a visit in the next 30 days with authorizing provider or within the authorizing provider's specialty.  See \"Patient Info\" tab in inbasket, or \"Choose Columns\" in Meds & Orders section of the refill encounter.      Last Written Prescription Date:  5/31/18  Last Fill Quantity: 3 ml,  # refills: 0   Last office visit: 12/21/2017 with prescribing provider:     Future Office Visit:   Next 5 appointments (look out 90 days)     Jul 16, 2018 11:20 AM CDT   [facility]    [address] [facility] [address] [phone]                          Passed - LDL on file in past 12 months    Recent Labs   Lab Test  12/21/17   0838   LDL  112*            Passed - Microalbumin on file in past 12 months    Recent Labs   Lab Test  08/23/17   0913   MICROL  <5   UMALCR  Unable to calculate due to low value            Passed - Serum creatinine on file in past 12 months    Recent Labs   Lab Test  08/23/17   0906   CR  0.67            Passed - Patient is age 18 or older        glipiZIDE (GLUCOTROL) 5 MG " "tablet [Pharmacy Med Name: GLIPIZIDE 5MG TABS] 90 tablet 1     Sig: TAKE ONE TABLET BY MOUTH EVERY DAY WITH DINNER    Sulfonylurea Agents Failed    7/13/2018 10:26 AM       Failed - Blood pressure less than 140/90 in past 6 months    BP Readings from Last 3 Encounters:   12/21/17 136/80   08/23/17 123/75   03/27/17 136/88                Failed - Patient has documented A1c within the specified period of time.    If HgbA1C is 8 or greater, it needs to be on file within the past 3 months.  If less than 8, must be on file within the past 6 months.     Recent Labs   Lab Test  12/21/17   0838   A1C  9.6*            Failed - Recent (6 mo) or future (30 days) visit within the authorizing provider's specialty    Patient had office visit in the last 6 months or has a visit in the next 30 days with authorizing provider or within the authorizing provider's specialty.  See \"Patient Info\" tab in inbasket, or \"Choose Columns\" in Meds & Orders section of the refill encounter.     Last Written Prescription Date:  12/21/17  Last Fill Quantity: 90,  # refills: 1   Last office visit: 12/21/2017 with prescribing provider:     Future Office Visit:   Next 5 appointments (look out 90 days)     Jul 16, 2018 11:20 AM CDT   [facility]    [address] [facility] [address]   [phone]                           Passed - Patient has documented LDL within the past 12 mos.    Recent Labs   Lab Test  12/21/17   0838   LDL  112*            Passed - Patient has had a Microalbumin in the past 12 mos.    Recent Labs   Lab Test  08/23/17   0913   MICROL  <5   UMALCR  Unable to calculate due to low value            Passed - Patient is age 18 or older       Passed - Patient has a recent creatinine (normal) within the past 12 mos.    Recent Labs   Lab Test  08/23/17   0906   CR  0.67               "

## 2018-07-13 NOTE — TELEPHONE ENCOUNTER
Routing refill requests to provider for review/approval because:  Labs not current: Hgb A1c  Does have an Office Visit scheduled for Monday 7/16/18    Starla GRANADOS RN

## 2018-07-16 ENCOUNTER — OFFICE VISIT (OUTPATIENT)
Dept: FAMILY MEDICINE | Facility: CLINIC | Age: 73
End: 2018-07-16
Payer: COMMERCIAL

## 2018-07-16 VITALS
DIASTOLIC BLOOD PRESSURE: 88 MMHG | HEIGHT: 69 IN | BODY MASS INDEX: 29.58 KG/M2 | HEART RATE: 79 BPM | TEMPERATURE: 98 F | SYSTOLIC BLOOD PRESSURE: 142 MMHG | WEIGHT: 199.7 LBS

## 2018-07-16 DIAGNOSIS — E78.5 HYPERLIPIDEMIA LDL GOAL <100: ICD-10-CM

## 2018-07-16 DIAGNOSIS — Z13.89 SCREENING FOR DIABETIC PERIPHERAL NEUROPATHY: ICD-10-CM

## 2018-07-16 DIAGNOSIS — E11.40 TYPE 2 DIABETES MELLITUS WITH DIABETIC NEUROPATHY, WITH LONG-TERM CURRENT USE OF INSULIN (H): ICD-10-CM

## 2018-07-16 DIAGNOSIS — Z79.4 TYPE 2 DIABETES MELLITUS WITH DIABETIC NEUROPATHY, WITH LONG-TERM CURRENT USE OF INSULIN (H): ICD-10-CM

## 2018-07-16 DIAGNOSIS — Z12.11 SCREEN FOR COLON CANCER: ICD-10-CM

## 2018-07-16 DIAGNOSIS — I10 ESSENTIAL HYPERTENSION WITH GOAL BLOOD PRESSURE LESS THAN 130/85: ICD-10-CM

## 2018-07-16 LAB — HBA1C MFR BLD: 8.9 % (ref 0–5.6)

## 2018-07-16 PROCEDURE — 83036 HEMOGLOBIN GLYCOSYLATED A1C: CPT | Performed by: FAMILY MEDICINE

## 2018-07-16 PROCEDURE — 99214 OFFICE O/P EST MOD 30 MIN: CPT | Performed by: FAMILY MEDICINE

## 2018-07-16 PROCEDURE — 36415 COLL VENOUS BLD VENIPUNCTURE: CPT | Performed by: FAMILY MEDICINE

## 2018-07-16 PROCEDURE — 99207 C FOOT EXAM  NO CHARGE: CPT | Performed by: FAMILY MEDICINE

## 2018-07-16 RX ORDER — ATORVASTATIN CALCIUM 20 MG/1
20 TABLET, FILM COATED ORAL DAILY
Qty: 90 TABLET | Refills: 0 | Status: SHIPPED | OUTPATIENT
Start: 2018-07-16 | End: 2018-12-19

## 2018-07-16 RX ORDER — GLIPIZIDE 5 MG/1
TABLET ORAL
Qty: 90 TABLET | Refills: 1 | Status: SHIPPED | OUTPATIENT
Start: 2018-07-16 | End: 2018-12-19

## 2018-07-16 RX ORDER — LOSARTAN POTASSIUM 100 MG/1
100 TABLET ORAL DAILY
Qty: 90 TABLET | Refills: 0 | Status: SHIPPED | OUTPATIENT
Start: 2018-07-16 | End: 2018-12-19

## 2018-07-16 RX ORDER — INSULIN GLARGINE 100 [IU]/ML
INJECTION, SOLUTION SUBCUTANEOUS
Qty: 15 ML | Refills: 0 | Status: SHIPPED | OUTPATIENT
Start: 2018-07-16 | End: 2018-07-16

## 2018-07-16 RX ORDER — GLIPIZIDE 5 MG/1
TABLET ORAL
Qty: 90 TABLET | Refills: 1 | Status: SHIPPED | OUTPATIENT
Start: 2018-07-16 | End: 2018-07-16

## 2018-07-16 ASSESSMENT — ANXIETY QUESTIONNAIRES
2. NOT BEING ABLE TO STOP OR CONTROL WORRYING: MORE THAN HALF THE DAYS
7. FEELING AFRAID AS IF SOMETHING AWFUL MIGHT HAPPEN: NOT AT ALL
1. FEELING NERVOUS, ANXIOUS, OR ON EDGE: MORE THAN HALF THE DAYS
3. WORRYING TOO MUCH ABOUT DIFFERENT THINGS: SEVERAL DAYS
5. BEING SO RESTLESS THAT IT IS HARD TO SIT STILL: NOT AT ALL
6. BECOMING EASILY ANNOYED OR IRRITABLE: NEARLY EVERY DAY
GAD7 TOTAL SCORE: 9

## 2018-07-16 ASSESSMENT — PAIN SCALES - GENERAL: PAINLEVEL: NO PAIN (0)

## 2018-07-16 ASSESSMENT — PATIENT HEALTH QUESTIONNAIRE - PHQ9: 5. POOR APPETITE OR OVEREATING: SEVERAL DAYS

## 2018-07-16 NOTE — PATIENT INSTRUCTIONS
Restart your meds lantus 20 units -start tonight    Check you blood sugars twice a day   Call me on Thursday and leave message with your blood sugar results.  [phone]

## 2018-07-16 NOTE — PROGRESS NOTES
SUBJECTIVE:                                                    Ramo Adams is a 73 year old male who presents to clinic today for the following health issues:    Diabetes Follow-up      Patient is checking blood sugars: not at all    Diabetic concerns: None that he knows of. He has not been taking his blood sugars.     Symptoms of hypoglycemia (low blood sugar): none     Paresthesias (numbness or burning in feet) or sores: No     Date of last diabetic eye exam: He is not sure when his last one was    Diabetes Management Resources    Hyperlipidemia Follow-Up      Rate your low fat/cholesterol diet?: not monitoring fat    Taking statin?  Yes, no muscle aches from statin    Other lipid medications/supplements?:  none    Hypertension Follow-up      Outpatient blood pressures are not being checked.    Low Salt Diet: not monitoring salt    BP Readings from Last 2 Encounters:   07/16/18 142/88   12/21/17 136/80     Hemoglobin A1C (%)   Date Value   07/16/2018 8.9 (H)   12/21/2017 9.6 (H)     LDL Cholesterol Calculated (mg/dL)   Date Value   12/21/2017 112 (H)   12/14/2016 140 (H)       Amount of exercise or physical activity: None    Problems taking medications regularly: Yes,  He has been taking care of his wife and has not taken the time to take care of himself    Medication side effects: none    Diet: regular (no restrictions)    **He is worn out and exhausted. He has been caring for his wife for the past two years. He is frustrated and says he has no care to live.     Problem list and histories reviewed & adjusted, as indicated.  Additional history:     Patient Active Problem List   Diagnosis     Health Care Home     Hyperlipidemia LDL goal <100     Overweight     Essential hypertension with goal blood pressure less than 130/85     Type 2 diabetes mellitus with diabetic neuropathy, with long-term current use of insulin (H)     Past Surgical History:   Procedure Laterality Date     SPECIMEN TO PATHOLOGY  2008    colon  "resection - 6-7 inches       Social History   Substance Use Topics     Smoking status: Never Smoker     Smokeless tobacco: Never Used     Alcohol use Yes      Comment: very rare - 1 beer once a month or less     Family History   Problem Relation Age of Onset     Diabetes Mother      Alzheimer Disease Father      Diabetes Sister      Diabetes Maternal Grandmother            ROS:  Constitutional, HEENT, cardiovascular, pulmonary, gi and gu systems are negative, except as otherwise noted.    OBJECTIVE:                                                    /88 (BP Location: Right arm, Patient Position: Sitting, Cuff Size: Adult Large)  Pulse 79  Temp 98  F (36.7  C) (Tympanic)  Ht 5' 8.5\" (1.74 m)  Wt 199 lb 11.2 oz (90.6 kg)  BMI 29.92 kg/m2 Body mass index is 29.92 kg/(m^2).   GENERAL: healthy, alert, well nourished, well hydrated, no distress  HENT: ear canals- normal; TMs- normal; Nose- normal; Mouth- no ulcers, no lesions  NECK: no tenderness, no adenopathy, no asymmetry, no masses, no stiffness; thyroid- normal to palpation  RESP: lungs clear to auscultation - no rales, no rhonchi, no wheezes  CV: regular rates and rhythm, normal S1 S2, no S3 or S4 and no murmur, no click or rub -  ABDOMEN: soft, no tenderness, no  hepatosplenomegaly, no masses, normal bowel sounds       ASSESSMENT/PLAN:                                                      Dysthymia - overwhelmed woith wifes poor health and still working  Discussed antidepressants  He will hold off for now.    (E11.40,  Z79.4) Type 2 diabetes mellitus with diabetic neuropathy, with long-term current use of insulin (H)  Comment:   Poor control but improved  Plan: glipiZIDE (GLUCOTROL) 5 MG tablet, blood         glucose monitoring (PILY CONTOUR) test strip,         insulin pen needle (ULTICARE SHORT) 31G X 8 MM,        insulin glargine (LANTUS SOLOSTAR) 100 UNIT/ML         pen            (I10) Essential hypertension with goal blood pressure less than " 130/85  Comment: Good control.  Continue currant medications, continue to monito   Plan: losartan (COZAAR) 100 MG tablet        Good control.  Continue currant medications, continue to monito     (E78.5) Hyperlipidemia LDL goal <100  Comment: Good control.  Continue currant medications, continue to monito   Plan: atorvastatin (LIPITOR) 20 MG tablet            (Z12.11) Screen for colon cancer  Plan: GASTROENTEROLOGY ADULT REF PROCEDURE ONLY            (Z13.89) Screening for diabetic peripheral neuropathy  Plan: FOOT EXAM  NO CHARGE [57973.114], HEMOGLOBIN         A1C     Patient Instructions   Restart your meds lantus 20 units -start tonight    Check you blood sugars twice a day   Call me on Thursday and leave message with your blood sugar results.  [phone]                reports that he has never smoked. He has never used smokeless tobacco.      Weight management plan: Discussed healthy diet and exercise guidelines and patient will follow up in 3 months in clinic to re-evaluate.      [facility]

## 2018-07-16 NOTE — MR AVS SNAPSHOT
After Visit Summary   7/16/2018    Ramo Adams    MRN: [id number]           Patient Information     Date Of Birth          1945        Visit Information        Provider Department      7/16/2018 11:20 AM Santiago Ko MD [facility]        Today's Diagnoses     Screen for colon cancer        Screening for diabetic peripheral neuropathy        Type 2 diabetes mellitus with diabetic neuropathy, with long-term current use of insulin (H)        Essential hypertension with goal blood pressure less than 130/85        Hyperlipidemia LDL goal <100          Care Instructions    Restart your meds lantus 20 units -start tonight    Check you blood sugars twice a day   Call me on Thursday and leave message with your blood sugar results.  [phone]            Follow-ups after your visit        Additional Services     GASTROENTEROLOGY ADULT REF PROCEDURE ONLY       Last Lab Result: Creatinine (mg/dL)       Date                     Value                 08/23/2017               0.67             ----------  Body mass index is 29.92 kg/(m^2).     Needed:  No  Language:  English    Patient will be contacted to schedule procedure.     Please be aware that coverage of these services is subject to the terms and limitations of your health insurance plan.  Call member services at your health plan with any benefit or coverage questions.  Any procedures must be performed at a [place] facility OR coordinated by your clinic's referral office.    Please bring the following with you to your appointment:    (1) Any X-Rays, CTs or MRIs which have been performed.  Contact the facility where they were done to arrange for  prior to your scheduled appointment.    (2) List of current medications   (3) This referral request   (4) Any documents/labs given to you for this referral                  Who to contact     Normal or non-critical lab and imaging results will be communicated to you by Latoya  "letter or phone within 4 business days after the clinic has received the results. If you do not hear from us within 7 days, please contact the clinic through [employer] or phone. If you have a critical or abnormal lab result, we will notify you by phone as soon as possible.  Submit refill requests through [employer] or call your pharmacy and they will forward the refill request to us. Please allow 3 business days for your refill to be completed.          If you need to speak with a  for additional information , please call: [phone]             Additional Information About Your Visit        Care EveryWhere ID     This is your Care EveryWhere ID. This could be used by other organizations to access your [facility] medical records  [id number]        Your Vitals Were     Pulse Temperature Height BMI (Body Mass Index)          79 98  F (36.7  C) (Tympanic) 5' 8.5\" (1.74 m) 29.92 kg/m2         Blood Pressure from Last 3 Encounters:   07/16/18 142/88   12/21/17 136/80   08/23/17 123/75    Weight from Last 3 Encounters:   07/16/18 199 lb 11.2 oz (90.6 kg)   12/21/17 194 lb (88 kg)   08/23/17 194 lb (88 kg)              We Performed the Following     FOOT EXAM  NO CHARGE [23794.114]     GASTROENTEROLOGY ADULT REF PROCEDURE ONLY     HEMOGLOBIN A1C          Where to get your medicines      These medications were sent to [facility] [place] - [address]  [address] [address] [facility] [address]     Phone:  [phone]     atorvastatin 20 MG tablet    blood glucose monitoring test strip    glipiZIDE 5 MG tablet    insulin glargine 100 UNIT/ML injection    insulin pen needle 31G X 8 MM    losartan 100 MG tablet          Primary Care Provider Office Phone # Fax #    Santiago Ko [phone] [address]  [facility] [address]        Equal Access to Services     ILDA SOUZA AH: Buck camacho Sojose, waaxda luqadaha, qaybta kaalmada adeegyada, darlene simmons " yuangreysonsundeep hartmanncj neolacey lashaniquesundeep ah. So [facility] [phone].    ATENCIÓN: Si saumyala basim, tiene a butcher disposición servicios gratuitos de asistencia lingüística. Lynette foley [phone].    We comply with applicable federal civil rights laws and [place] laws. We do not discriminate on the basis of race, color, national origin, age, disability, sex, sexual orientation, or gender identity.            Thank you!     Thank you for choosing [facility]  for your care. Our goal is always to provide you with excellent care. Hearing back from our patients is one way we can continue to improve our services. Please take a few minutes to complete the written survey that you may receive in the mail after your visit with us. Thank you!             Your Updated Medication List - Protect others around you: Learn how to safely use, store and throw away your medicines at www.disposemymeds.org.          This list is accurate as of 7/16/18 11:40 AM.  Always use your most recent med list.                   Brand Name Dispense Instructions for use Diagnosis    aspirin 81 MG tablet      Take 1 tablet by mouth daily.        atorvastatin 20 MG tablet    LIPITOR    90 tablet    Take 1 tablet (20 mg) by mouth daily    Hyperlipidemia LDL goal <100       blood glucose monitoring test strip    PILY CONTOUR    200 each    USE TO TEST BLOOD SUGAR TWO TIMES A DAY OR AS DIRECTED    Type 2 diabetes mellitus with diabetic neuropathy, with long-term current use of insulin (H)       [facility]UR[facility] PO      Take 1 tablet by mouth daily.        glipiZIDE 5 MG tablet    GLUCOTROL    90 tablet    TAKE ONE TABLET BY MOUTH EVERY DAY WITH DINNER    Type 2 diabetes mellitus with diabetic neuropathy, with long-term current use of insulin (H)       insulin glargine 100 UNIT/ML injection    LANTUS SOLOSTAR    15 mL    INJECT 20 UNITS UNDER THE SKIN TWICE DAILY (FOLLOW UP APPOINTMENT NEEDED FOR REFILLS)    Type 2 diabetes mellitus with diabetic neuropathy, with  long-term current use of insulin (H)       insulin pen needle 31G X 8 MM    ULTICARE SHORT    200 each    USE TWO TIMES A DAY WITH LANTUS SOLOSTAR PEN    Type 2 diabetes mellitus with diabetic neuropathy, with long-term current use of insulin (H)       losartan 100 MG tablet    COZAAR    90 tablet    Take 1 tablet (100 mg) by mouth daily    Essential hypertension with goal blood pressure less than 130/85

## 2018-07-17 ENCOUNTER — TELEPHONE (OUTPATIENT)
Dept: FAMILY MEDICINE | Facility: CLINIC | Age: 73
End: 2018-07-17

## 2018-07-17 ASSESSMENT — PATIENT HEALTH QUESTIONNAIRE - PHQ9: SUM OF ALL RESPONSES TO PHQ QUESTIONS 1-9: 3

## 2018-07-17 ASSESSMENT — ANXIETY QUESTIONNAIRES: GAD7 TOTAL SCORE: 9

## 2018-07-17 NOTE — TELEPHONE ENCOUNTER
Reason for Call:  FYI ONLY    Procedure  reached out to patient to schedule colonoscopy    Detailed comments: patient states that he was not aware it was ordered and that he will not be having procedure    No further action necessary by procedure     Phone Number Patient can be reached at: Home number on file [phone] (home)    Best Time: NA    Can we leave a detailed message on this number? Not Applicable    Call taken on 7/17/2018 at 2:48 PM by Nia Hercules

## 2018-07-20 ENCOUNTER — TELEPHONE (OUTPATIENT)
Dept: FAMILY MEDICINE | Facility: CLINIC | Age: 73
End: 2018-07-20

## 2018-07-20 NOTE — TELEPHONE ENCOUNTER
Please let Ho know that the blood sugars look great. He should stay on the lantus and check the blood sugar when he is feeling off and maybe 1-2 times per week. He should see Dr. Ko in  3 months. Sooner if he is not feeling any better  Piper Irving M.D.'

## 2018-07-20 NOTE — TELEPHONE ENCOUNTER
Reason for Call:  Other blood sugar results    Detailed comments: Ho was asked to call in his blood sugar results.  They are as follows:  7/17 P.m.   120  7/18 A.m.      125    P.m.  118  7/19 A.m. 125   P.m. 118  7/20 A.m.  120    Please review and advise. Thank you..Sary Rodriguez    Phone Number Patient can be reached at: Home number on file [phone] (home)    Best Time: any time    Can we leave a detailed message on this number? YES    Call taken on 7/20/2018 at 10:54 AM by Sary Rodriguez

## 2018-09-05 DIAGNOSIS — E11.40 TYPE 2 DIABETES MELLITUS WITH DIABETIC NEUROPATHY, WITH LONG-TERM CURRENT USE OF INSULIN (H): ICD-10-CM

## 2018-09-05 DIAGNOSIS — Z79.4 TYPE 2 DIABETES MELLITUS WITH DIABETIC NEUROPATHY, WITH LONG-TERM CURRENT USE OF INSULIN (H): ICD-10-CM

## 2018-09-05 NOTE — TELEPHONE ENCOUNTER
Medication is being filled for 1 time refill only due to:  Patient needs to be seen because needs 3 month follow up visit.   Jose Sanchez RN

## 2018-09-05 NOTE — TELEPHONE ENCOUNTER
Ramo would like to get two boxes of lantus at on time. Please send an order out to us. Thanks!    See Bob  Pharmacy Technician, [place]  [facility]  Phone: [phone]  Fax: [phone]

## 2018-12-19 ENCOUNTER — OFFICE VISIT (OUTPATIENT)
Dept: FAMILY MEDICINE | Facility: CLINIC | Age: 73
End: 2018-12-19
Payer: COMMERCIAL

## 2018-12-19 VITALS
WEIGHT: 195.4 LBS | HEART RATE: 76 BPM | DIASTOLIC BLOOD PRESSURE: 68 MMHG | BODY MASS INDEX: 28.94 KG/M2 | HEIGHT: 69 IN | SYSTOLIC BLOOD PRESSURE: 116 MMHG | TEMPERATURE: 98.3 F

## 2018-12-19 DIAGNOSIS — R80.9 MICROALBUMINURIA DUE TO TYPE 2 DIABETES MELLITUS (H): ICD-10-CM

## 2018-12-19 DIAGNOSIS — E78.5 HYPERLIPIDEMIA LDL GOAL <100: ICD-10-CM

## 2018-12-19 DIAGNOSIS — E11.40 TYPE 2 DIABETES MELLITUS WITH DIABETIC NEUROPATHY, WITH LONG-TERM CURRENT USE OF INSULIN (H): ICD-10-CM

## 2018-12-19 DIAGNOSIS — I10 ESSENTIAL HYPERTENSION WITH GOAL BLOOD PRESSURE LESS THAN 130/85: ICD-10-CM

## 2018-12-19 DIAGNOSIS — Z23 NEED FOR PROPHYLACTIC VACCINATION AND INOCULATION AGAINST INFLUENZA: Primary | ICD-10-CM

## 2018-12-19 DIAGNOSIS — E11.29 MICROALBUMINURIA DUE TO TYPE 2 DIABETES MELLITUS (H): ICD-10-CM

## 2018-12-19 DIAGNOSIS — Z79.4 TYPE 2 DIABETES MELLITUS WITH DIABETIC NEUROPATHY, WITH LONG-TERM CURRENT USE OF INSULIN (H): ICD-10-CM

## 2018-12-19 LAB
ANION GAP SERPL CALCULATED.3IONS-SCNC: 5 MMOL/L (ref 3–14)
BUN SERPL-MCNC: 17 MG/DL (ref 7–30)
CALCIUM SERPL-MCNC: 9.1 MG/DL (ref 8.5–10.1)
CHLORIDE SERPL-SCNC: 107 MMOL/L (ref 94–109)
CO2 SERPL-SCNC: 27 MMOL/L (ref 20–32)
CREAT SERPL-MCNC: 0.62 MG/DL (ref 0.66–1.25)
CREAT UR-MCNC: 177 MG/DL
GFR SERPL CREATININE-BSD FRML MDRD: >90 ML/MIN/{1.73_M2}
GLUCOSE SERPL-MCNC: 135 MG/DL (ref 70–99)
HBA1C MFR BLD: 8.6 % (ref 0–5.6)
LDLC SERPL DIRECT ASSAY-MCNC: 121 MG/DL
MICROALBUMIN UR-MCNC: 107 MG/L
MICROALBUMIN/CREAT UR: 60.45 MG/G CR (ref 0–17)
POTASSIUM SERPL-SCNC: 4.1 MMOL/L (ref 3.4–5.3)
SODIUM SERPL-SCNC: 139 MMOL/L (ref 133–144)

## 2018-12-19 PROCEDURE — 83721 ASSAY OF BLOOD LIPOPROTEIN: CPT | Performed by: FAMILY MEDICINE

## 2018-12-19 PROCEDURE — 80048 BASIC METABOLIC PNL TOTAL CA: CPT | Performed by: FAMILY MEDICINE

## 2018-12-19 PROCEDURE — 90662 IIV NO PRSV INCREASED AG IM: CPT | Performed by: FAMILY MEDICINE

## 2018-12-19 PROCEDURE — 36415 COLL VENOUS BLD VENIPUNCTURE: CPT | Performed by: FAMILY MEDICINE

## 2018-12-19 PROCEDURE — 83036 HEMOGLOBIN GLYCOSYLATED A1C: CPT | Performed by: FAMILY MEDICINE

## 2018-12-19 PROCEDURE — G0008 ADMIN INFLUENZA VIRUS VAC: HCPCS | Performed by: FAMILY MEDICINE

## 2018-12-19 PROCEDURE — 82043 UR ALBUMIN QUANTITATIVE: CPT | Performed by: FAMILY MEDICINE

## 2018-12-19 PROCEDURE — 99214 OFFICE O/P EST MOD 30 MIN: CPT | Mod: 25 | Performed by: FAMILY MEDICINE

## 2018-12-19 RX ORDER — GLIPIZIDE 5 MG/1
TABLET ORAL
Qty: 90 TABLET | Refills: 3 | Status: SHIPPED | OUTPATIENT
Start: 2018-12-19 | End: 2020-01-13

## 2018-12-19 RX ORDER — LOSARTAN POTASSIUM 100 MG/1
100 TABLET ORAL DAILY
Qty: 90 TABLET | Refills: 3 | Status: SHIPPED | OUTPATIENT
Start: 2018-12-19 | End: 2020-01-15

## 2018-12-19 RX ORDER — ATORVASTATIN CALCIUM 20 MG/1
20 TABLET, FILM COATED ORAL DAILY
Qty: 90 TABLET | Refills: 3 | Status: SHIPPED | OUTPATIENT
Start: 2018-12-19 | End: 2024-01-29

## 2018-12-19 ASSESSMENT — MIFFLIN-ST. JEOR: SCORE: 1623.77

## 2018-12-19 NOTE — PROGRESS NOTES
SUBJECTIVE:                                                    Raom Adams is a 73 year old male who presents to clinic today for the following health issues:    Diabetes Follow-up    Patient is checking blood sugars: 120/130    Diabetic concerns: None     Symptoms of hypoglycemia (low blood sugar): none     Paresthesias (numbness or burning in feet) or sores: No     Date of last diabetic eye exam:     Diabetes Management Resources    Hyperlipidemia Follow-Up    Rate your low fat/cholesterol diet?: good    Taking statin?  Yes, no muscle aches from statin    Other lipid medications/supplements?:  none    Hypertension Follow-up    Outpatient blood pressures are not being checked.    Low Salt Diet: low salt    BP Readings from Last 2 Encounters:   12/19/18 116/68   07/16/18 142/88     Hemoglobin A1C (%)   Date Value   12/19/2018 8.6 (H)   07/16/2018 8.9 (H)     LDL Cholesterol Calculated (mg/dL)   Date Value   12/21/2017 112 (H)   12/14/2016 140 (H)             Problem list and histories reviewed & adjusted, as indicated.  Additional history: numbers are better he is feeling better   His wife is getting better   He is feeling better   Blood pressure is better   He is taking the glipizide   And the lantus he is taking 20 units per day   No blood sugar under 105     No chest pain    No shortness of breath                Patient Active Problem List   Diagnosis     Health Care Home     Hyperlipidemia LDL goal <100     Overweight     Essential hypertension with goal blood pressure less than 130/85     Type 2 diabetes mellitus with diabetic neuropathy, with long-term current use of insulin (H)     Past Surgical History:   Procedure Laterality Date     SPECIMEN TO PATHOLOGY  2008    colon resection - 6-7 inches       Social History     Tobacco Use     Smoking status: Never Smoker     Smokeless tobacco: Never Used   Substance Use Topics     Alcohol use: Yes     Comment: very rare - 1 beer once a month or less     Family  "History   Problem Relation Age of Onset     Diabetes Mother      Alzheimer Disease Father      Diabetes Sister      Diabetes Maternal Grandmother          He is very active   Concerned about insulin   He is going to get the eyes done   Pre dinner has been down to 100  Avoiding fast food helps         ROS:  Constitutional, HEENT, cardiovascular, pulmonary, GI, , musculoskeletal, neuro, skin, endocrine and psych systems are negative, except as otherwise noted.    OBJECTIVE:                                                    /68   Pulse 76   Temp 98.3  F (36.8  C) (Tympanic)   Ht 1.74 m (5' 8.5\")   Wt 88.6 kg (195 lb 6.4 oz)   BMI 29.28 kg/m   Body mass index is 29.28 kg/m .   GENERAL: healthy, alert, well nourished, well hydrated, no distress  NECK: no tenderness, no adenopathy, no asymmetry, no masses, no stiffness; thyroid- normal to palpation  RESP: lungs clear to auscultation - no rales, no rhonchi, no wheezes  CV: regular rates and rhythm, normal S1 S2, no S3 or S4 and no murmur, no click or rub -  ABDOMEN: soft, no tenderness, no  hepatosplenomegaly, no masses, normal bowel sounds  MS: extremities- no gross deformities noted, no edema  PSYCH: Alert and oriented times 3; speech- coherent , normal rate and volume; able to articulate logical thoughts, able to abstract reason, no tangential thoughts, no hallucinations or delusions, affect- normal       ASSESSMENT/PLAN:                                                    1. Essential hypertension with goal blood pressure less than 130/85  Good control   - Basic metabolic panel  - losartan (COZAAR) 100 MG tablet; Take 1 tablet (100 mg) by mouth daily  Dispense: 90 tablet; Refill: 3    2. Type 2 diabetes mellitus with diabetic neuropathy, with long-term current use of insulin (H)  a1c is better doesn't want to increase the insulin suggested slight increase slowly to try to get the blood sugar down as he doesn't report any lows. He has declined any change to " his diabetic management.     - LDL cholesterol direct  - Hemoglobin A1c  - Albumin Random Urine Quantitative with Creat Ratio  - Basic metabolic panel  - insulin pen needle (ULTICARE SHORT) 31G X 8 MM miscellaneous; USE TWO TIMES A DAY WITH LANTUS SOLOSTAR PEN  Dispense: 200 each; Refill: 3  - insulin glargine (LANTUS SOLOSTAR PEN) 100 UNIT/ML pen; Inject 20 Units Subcutaneous 2 times daily  Dispense: 72 mL; Refill: 3  - glipiZIDE (GLUCOTROL) 5 MG tablet; TAKE ONE TABLET BY MOUTH EVERY DAY WITH DINNER  Dispense: 90 tablet; Refill: 3    3. Hyperlipidemia LDL goal <100  Not at goal . Not willing to increase medications at this time.   - LDL cholesterol direct  - atorvastatin (LIPITOR) 20 MG tablet; Take 1 tablet (20 mg) by mouth daily  Dispense: 90 tablet; Refill: 3        4. Need for prophylactic vaccination and inoculation against influenza  Done   - FLU VACCINE, INCREASED ANTIGEN, PRESV FREE, AGE 65+ [78562]  - ADMIN INFLUENZA (For MEDICARE Patients ONLY) []    5. Microalbuminuria due to type 2 diabetes mellitus (H)  Small amount recheck 3-6 months     Results for orders placed or performed in visit on 12/19/18   LDL cholesterol direct   Result Value Ref Range    LDL Cholesterol Direct 121 (H) <100 mg/dL   Hemoglobin A1c   Result Value Ref Range    Hemoglobin A1C 8.6 (H) 0 - 5.6 %   Albumin Random Urine Quantitative with Creat Ratio   Result Value Ref Range    Creatinine Urine 177 mg/dL    Albumin Urine mg/L 107 mg/L    Albumin Urine mg/g Cr 60.45 (H) 0 - 17 mg/g Cr   Basic metabolic panel   Result Value Ref Range    Sodium 139 133 - 144 mmol/L    Potassium 4.1 3.4 - 5.3 mmol/L    Chloride 107 94 - 109 mmol/L    Carbon Dioxide 27 20 - 32 mmol/L    Anion Gap 5 3 - 14 mmol/L    Glucose 135 (H) 70 - 99 mg/dL    Urea Nitrogen 17 7 - 30 mg/dL    Creatinine 0.62 (L) 0.66 - 1.25 mg/dL    GFR Estimate >90 >60 mL/min/[1.73_m2]    GFR Estimate If Black >90 >60 mL/min/[1.73_m2]    Calcium 9.1 8.5 - 10.1 mg/dL           reports that  has never smoked. he has never used smokeless tobacco.          Piper Irving M.D.  [facility]

## 2018-12-19 NOTE — LETTER
December 24, 2018      Ramo Adams  [address] [address]        Dear ,    We are writing to inform you of your test results.     Please let patient know that he did have some protein in he urine. He should be rechecked in 3 months. Other labs are stable. Piepr Irving M.D.;    Resulted Orders   LDL cholesterol direct   Result Value Ref Range    LDL Cholesterol Direct 121 (H) <100 mg/dL      Comment:      Above desirable:  100-129 mg/dl  Borderline High:  130-159 mg/dL  High:             160-189 mg/dL  Very high:       >189 mg/dl     Hemoglobin A1c   Result Value Ref Range    Hemoglobin A1C 8.6 (H) 0 - 5.6 %      Comment:      Normal <5.7% Prediabetes 5.7-6.4%  Diabetes 6.5% or higher - adopted from ADA   consensus guidelines.     Albumin Random Urine Quantitative with Creat Ratio   Result Value Ref Range    Creatinine Urine 177 mg/dL    Albumin Urine mg/L 107 mg/L    Albumin Urine mg/g Cr 60.45 (H) 0 - 17 mg/g Cr   Basic metabolic panel   Result Value Ref Range    Sodium 139 133 - 144 mmol/L    Potassium 4.1 3.4 - 5.3 mmol/L    Chloride 107 94 - 109 mmol/L    Carbon Dioxide 27 20 - 32 mmol/L    Anion Gap 5 3 - 14 mmol/L    Glucose 135 (H) 70 - 99 mg/dL      Comment:      Non Fasting    Urea Nitrogen 17 7 - 30 mg/dL    Creatinine 0.62 (L) 0.66 - 1.25 mg/dL    GFR Estimate >90 >60 mL/min/[1.73_m2]      Comment:      Non  GFR Calc  Starting 12/18/2018, serum creatinine based estimated GFR (eGFR) will be   calculated using the Chronic Kidney Disease Epidemiology Collaboration   (CKD-EPI) equation.      GFR Estimate If Black >90 >60 mL/min/[1.73_m2]      Comment:       GFR Calc  Starting 12/18/2018, serum creatinine based estimated GFR (eGFR) will be   calculated using the Chronic Kidney Disease Epidemiology Collaboration   (CKD-EPI) equation.      Calcium 9.1 8.5 - 10.1 mg/dL       If you have any questions or concerns, please call the clinic at the number  listed above.       Sincerely,        Piper Irving MD

## 2018-12-19 NOTE — PROGRESS NOTES

## 2019-03-25 ENCOUNTER — TELEPHONE (OUTPATIENT)
Dept: FAMILY MEDICINE | Facility: CLINIC | Age: 74
End: 2019-03-25

## 2019-03-25 DIAGNOSIS — Z79.4 TYPE 2 DIABETES MELLITUS WITH DIABETIC NEUROPATHY, WITH LONG-TERM CURRENT USE OF INSULIN (H): Primary | ICD-10-CM

## 2019-03-25 DIAGNOSIS — E11.40 TYPE 2 DIABETES MELLITUS WITH DIABETIC NEUROPATHY, WITH LONG-TERM CURRENT USE OF INSULIN (H): Primary | ICD-10-CM

## 2019-03-25 NOTE — TELEPHONE ENCOUNTER
novolog is $380.58 patient has a very high deductable so no matter what he is going to have a high cost.

## 2019-03-25 NOTE — TELEPHONE ENCOUNTER
Reason for Call:  Other prescription    Detailed comments: Ho went to pharmacy to get his lantus solostar and it was going to cost him $340.00.  He is not going to pay that amount and if that is the cost he will stop taking it.  His wife is very ill right now and he just can not deal with another problem. Right now he states that his health is on the back burner, as he is more concerned for his wife.  He wanted us to figure things out for him and find out where he can get this medication at a lower cost.  He asked that you call him.  I told him that you were seeing patients but would pass the information on to him.  Please call and assess. Thank you..Sary Rodriguez    Phone Number Patient can be reached at: Home number on file [phone] (home)    Best Time: any time    Can we leave a detailed message on this number? YES    Call taken on 3/25/2019 at 2:41 PM by Sary Rodriguez

## 2019-03-28 ENCOUNTER — PATIENT OUTREACH (OUTPATIENT)
Dept: CARE COORDINATION | Facility: CLINIC | Age: 74
End: 2019-03-28

## 2019-03-28 ASSESSMENT — ACTIVITIES OF DAILY LIVING (ADL): DEPENDENT_IADLS:: INDEPENDENT

## 2019-03-28 NOTE — PROGRESS NOTES
"Clinic Care Coordination Contact    Clinic Care Coordination Contact  OUTREACH    Referral Information:  Referral Source: PCP, pt did not fill his insulin due to cost.  Primary Diagnosis: Diabetes    SW placed a call to the pt and introduced self, title, and role.  SW explained that a referral was placed by pt's care team and this writer is calling to offer assistance of how I may be able to help.  Pt agreed to speak with this writer, but was angry in the delay in time of calls.  SW apologized profusely for the delay, but explained this writer was out of the office the past 2 days.    Pt began by telling this writer all that is wrong with the [facility], how ealth is the \"[facility],\" and is a rotten facility.  He continued to share how mad he is at our agency at great length for the cares we did/did not provide to his wife.  Pt continued to share that his wife is currently hospitalized at [facility] and is on a vent, etc.      SW listened to the pt for well over 40 minutes as he shared the frustrations with the health care system & pharmaceutical industry.  Pt told stories of the costs of medications, creames, etc that he could have gotten for less money when caring for his wife and want to know that his insulin is at the lowest price.      Pt shared that he has the finances to pay for his insulin, but does not feel that he should have to pay the cost.  SW reviewed pt's EMR and read the PharmD note, which stated that the pt has a very high deductible.  This writer explained this to the pt, at which time, he stopped speaking and informed this writer that his daughter did inform him that they changed insurance plans at the beginning of the year and he would have to pay more out of pocket up front, etc.    SW did provide education about deductibles, insurance plans, and expressed that there may be more affordable options, but it would include a syringe & vial, to which the pt declined.      At " the end of our conversation, pt asked that the [facility] have his medications ready for .  SW notified See, Pharm tech, to fill his insulin.    Chief Complaint   Patient presents with     Clinic Care Coordination - Initial     social work      Universal Utilization: Clinic Utilization  Difficulty keeping appointments:: Yes  Compliance Concerns: No  No-Show Concerns: No  No PCP office visit in Past Year: No  Utilization    Last refreshed: 3/25/2019  6:37 PM:  Hospital Admissions 0           Last refreshed: 3/25/2019  6:37 PM:  ED Visits 0           Last refreshed: 3/25/2019  6:37 PM:  No Show Count (past year) 0              Current as of: 3/25/2019  6:37 PM            Clinical Concerns:  Current Medical Concerns:  Diabetic    Current Behavioral Concerns: None    Education Provided to patient: See above     Pain  Pain (GOAL):: No    Health Maintenance Reviewed: Due/Overdue:  Health Maintenance Due   Topic Date Due     EYE EXAM Q1 YEAR  03/06/1946     ZOSTER IMMUNIZATION (1 of 2) 03/06/1997     MEDICARE ANNUAL WELLNESS VISIT  03/06/2012     ADVANCE DIRECTIVE PLANNING Q5 YRS  08/08/2017     A1C Q3 MO  03/19/2019     TSH W/ FREE T4 REFLEX Q2 YEAR  03/27/2019     Clinical Pathway: Diabetes    Medication Management:  Pt takes as prescribed and can afford his medications     Functional Status:  Dependent ADLs:: Independent  Dependent IADLs:: Independent  Bed or wheelchair confined:: No  Mobility Status: Independent  Fallen 2 or more times in the past year?: No  Any fall with injury in the past year?: No    Living Situation:  Current living arrangement:: I live in a private home with family  Type of residence:: Private home - stairs    Diet/Exercise/Sleep:  Diet:: Diabetic diet  Inadequate nutrition (GOAL):: No  Food Insecurity: No  Tube Feeding: No    Transportation:  Transportation concerns (GOAL):: No  Transportation means:: Regular car     Psychosocial:  Baptism or spiritual beliefs that  impact treatment:: No  Mental health DX:: No  Mental health management concern (GOAL):: No  Informal Support system:: Children, Spouse     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    Outreach Frequency: NA    Plan: [facility] will not open pt to cares as he is not in need of financial assistance.    Miranda Herrera  Social Work Care Coordinator  [facility]  [phone]

## 2019-04-09 ENCOUNTER — TELEPHONE (OUTPATIENT)
Dept: FAMILY MEDICINE | Facility: CLINIC | Age: 74
End: 2019-04-09

## 2019-04-09 NOTE — TELEPHONE ENCOUNTER
Central Prior Authorization Team   Phone: [phone]    Prior Authorization Approval    Authorization Effective Date: 1/9/2019  Authorization Expiration Date: 4/8/2020  Medication: insulin glargine (LANTUS SOLOSTAR PEN) 100 UNIT/ML pen-Approved-  Approved Dose/Quantity:   Reference #:     Insurance Company: [employer] - Phone [phone] Fax [phone]  Expected CoPay:       CoPay Card Available:      Foundation Assistance Needed:    Which Pharmacy is filling the prescription (Not needed for infusion/clinic administered): [facility] [place] - [address]  Pharmacy Notified: Yes  Patient Notified: Yes

## 2019-04-09 NOTE — TELEPHONE ENCOUNTER
Central Prior Authorization Team   Phone: [phone]    PA Initiation    Medication: insulin glargine (LANTUS SOLOSTAR PEN) 100 UNIT/ML pen  Insurance Company: [employer] - Phone [phone] Fax [phone]  Pharmacy Filling the Rx: [facility] [place]  [place], [place] - [address]  Filling Pharmacy Phone: [phone]  Filling Pharmacy Fax:    Start Date: 4/9/2019    Prior authorization initiated with [employer] over the phone.

## 2019-08-05 ENCOUNTER — TELEPHONE (OUTPATIENT)
Dept: FAMILY MEDICINE | Facility: CLINIC | Age: 74
End: 2019-08-05

## 2019-08-05 NOTE — TELEPHONE ENCOUNTER
"Ramo Adams was identified as being non-adherent to his/her Atorvastatin (medication name) in the last 30-90 days.  Reason(s) identified for non-adherence: patient does not see value in therapy  Intervention(s) offered to assist patient with adherence:  Provided patient education regarding CV health.  Ho told me he \"doesn't give a rat's ass\".  He did tell me that he wants to live, but didn't seem convinced that he should start the atorvastatin.  He spoke to me for about 20 minutes regarding his wife's death.  I told him we would not call him regarding his statin therapy again.      Recommendation to Provider: Speak to Ho regarding adherence to Atorvastatin  Recommended follow-up: Next visit  Completed by: Maryjane Eckert, PharmD  [facility]  [phone]            "

## 2019-09-30 ENCOUNTER — TRANSFERRED RECORDS (OUTPATIENT)
Dept: HEALTH INFORMATION MANAGEMENT | Facility: CLINIC | Age: 74
End: 2019-09-30

## 2020-01-03 DIAGNOSIS — E11.40 TYPE 2 DIABETES MELLITUS WITH DIABETIC NEUROPATHY, WITH LONG-TERM CURRENT USE OF INSULIN (H): ICD-10-CM

## 2020-01-03 DIAGNOSIS — Z79.4 TYPE 2 DIABETES MELLITUS WITH DIABETIC NEUROPATHY, WITH LONG-TERM CURRENT USE OF INSULIN (H): ICD-10-CM

## 2020-01-03 RX ORDER — INSULIN GLARGINE 100 [IU]/ML
INJECTION, SOLUTION SUBCUTANEOUS
Qty: 75 ML | Refills: 0 | Status: SHIPPED | OUTPATIENT
Start: 2020-01-03 | End: 2020-06-24

## 2020-01-03 NOTE — TELEPHONE ENCOUNTER
A prescription for Lantus was filled due to need for insulin.  >1yr since last clinic visit.  Requested Sary sliding scale contact patient to schedule visit as patient should be seen Q6 months.  Nidia Shafer RN

## 2020-01-03 NOTE — TELEPHONE ENCOUNTER
"Requested Prescriptions   Pending Prescriptions Disp Refills     LANTUS SOLOSTAR 100 UNIT/ML soln [Pharmacy Med Name: LANTUS SOLOSTAR 100UNIT/ML SOPN]  Last Written Prescription Date:  12/19/18  Last Fill Quantity: 72ml,  # refills: 3   Last office visit: 12/19/2018 with prescribing provider:  sergio   Future Office Visit:     75 mL 3     Sig: INJECT 20 UNITS UNDER THE SKIN TWICE DAILY       Long Acting Insulin Protocol Failed - 1/3/2020 10:44 AM        Failed - Blood pressure less than 140/90 in past 6 months     BP Readings from Last 3 Encounters:   12/19/18 116/68   07/16/18 142/88   12/21/17 136/80                 Failed - LDL on file in past 12 months     Recent Labs   Lab Test 12/19/18  1059   *             Failed - Serum creatinine on file in past 12 months     Recent Labs   Lab Test 12/19/18  1059   CR 0.62*             Failed - HgbA1C in past 3 or 6 months     If HgbA1C is 8 or greater, it needs to be on file within the past 3 months.  If less than 8, must be on file within the past 6 months.     Recent Labs   Lab Test 12/19/18  1059   A1C 8.6*             Failed - Recent (6 mo) or future (30 days) visit within the authorizing provider's specialty     Patient had office visit in the last 6 months or has a visit in the next 30 days with authorizing provider or within the authorizing provider's specialty.  See \"Patient Info\" tab in inbasket, or \"Choose Columns\" in Meds & Orders section of the refill encounter.            Passed - Microalbumin on file in past 12 months     Recent Labs   Lab Test 12/19/18  1103   MICROL 107   UMALCR 60.45*             Passed - Medication is active on med list        Passed - Patient is age 18 or older        insulin pen needle (ULTICARE SHORT) 31G X 8 MM miscellaneous [Pharmacy Med Name: ULTICARE SHORT PEN  31G X 8 MM MISC]  Last Written Prescription Date:  12/19/18  Last Fill Quantity: 200,  # refills: 3   Last office visit: 12/19/2018 with prescribing provider:  " "sergio   Future Office Visit:     200 each 3     Sig: USE TWICE A DAY WITH LANTUS SOLOSTAR PEN       Diabetic Supplies Protocol Failed - 1/3/2020 10:44 AM        Failed - Recent (6 mo) or future (30 days) visit within the authorizing provider's specialty     Patient had office visit in the last 6 months or has a visit in the next 30 days with authorizing provider.  See \"Patient Info\" tab in inbasket, or \"Choose Columns\" in Meds & Orders section of the refill encounter.            Passed - Medication is active on med list        Passed - Patient is 18 years of age or older          "

## 2020-01-13 ENCOUNTER — OFFICE VISIT (OUTPATIENT)
Dept: FAMILY MEDICINE | Facility: CLINIC | Age: 75
End: 2020-01-13
Payer: COMMERCIAL

## 2020-01-13 DIAGNOSIS — Z13.220 SCREENING FOR HYPERLIPIDEMIA: ICD-10-CM

## 2020-01-13 DIAGNOSIS — E11.40 TYPE 2 DIABETES MELLITUS WITH DIABETIC NEUROPATHY, WITH LONG-TERM CURRENT USE OF INSULIN (H): ICD-10-CM

## 2020-01-13 DIAGNOSIS — I10 ESSENTIAL HYPERTENSION WITH GOAL BLOOD PRESSURE LESS THAN 130/85: ICD-10-CM

## 2020-01-13 DIAGNOSIS — Z79.4 TYPE 2 DIABETES MELLITUS WITH DIABETIC NEUROPATHY, WITH LONG-TERM CURRENT USE OF INSULIN (H): ICD-10-CM

## 2020-01-13 LAB
ANION GAP SERPL CALCULATED.3IONS-SCNC: 4 MMOL/L (ref 3–14)
BUN SERPL-MCNC: 14 MG/DL (ref 7–30)
CALCIUM SERPL-MCNC: 9.3 MG/DL (ref 8.5–10.1)
CHLORIDE SERPL-SCNC: 106 MMOL/L (ref 94–109)
CHOLEST SERPL-MCNC: 189 MG/DL
CO2 SERPL-SCNC: 27 MMOL/L (ref 20–32)
CREAT SERPL-MCNC: 0.7 MG/DL (ref 0.66–1.25)
CREAT UR-MCNC: 224 MG/DL
GFR SERPL CREATININE-BSD FRML MDRD: >90 ML/MIN/{1.73_M2}
GLUCOSE SERPL-MCNC: 138 MG/DL (ref 70–99)
HBA1C MFR BLD: 8.7 % (ref 0–5.6)
HDLC SERPL-MCNC: 46 MG/DL
LDLC SERPL CALC-MCNC: 121 MG/DL
MICROALBUMIN UR-MCNC: 38 MG/L
MICROALBUMIN/CREAT UR: 17.19 MG/G CR (ref 0–17)
NONHDLC SERPL-MCNC: 143 MG/DL
POTASSIUM SERPL-SCNC: 4.1 MMOL/L (ref 3.4–5.3)
SODIUM SERPL-SCNC: 137 MMOL/L (ref 133–144)
TRIGL SERPL-MCNC: 110 MG/DL
TSH SERPL DL<=0.005 MIU/L-ACNC: 1.48 MU/L (ref 0.4–4)

## 2020-01-13 PROCEDURE — 80061 LIPID PANEL: CPT | Performed by: FAMILY MEDICINE

## 2020-01-13 PROCEDURE — 99213 OFFICE O/P EST LOW 20 MIN: CPT | Performed by: FAMILY MEDICINE

## 2020-01-13 PROCEDURE — 36415 COLL VENOUS BLD VENIPUNCTURE: CPT | Performed by: FAMILY MEDICINE

## 2020-01-13 PROCEDURE — 83036 HEMOGLOBIN GLYCOSYLATED A1C: CPT | Performed by: FAMILY MEDICINE

## 2020-01-13 PROCEDURE — 84443 ASSAY THYROID STIM HORMONE: CPT | Performed by: FAMILY MEDICINE

## 2020-01-13 PROCEDURE — 82043 UR ALBUMIN QUANTITATIVE: CPT | Performed by: FAMILY MEDICINE

## 2020-01-13 PROCEDURE — 80048 BASIC METABOLIC PNL TOTAL CA: CPT | Performed by: FAMILY MEDICINE

## 2020-01-13 RX ORDER — GLIPIZIDE 5 MG/1
5 TABLET ORAL 2 TIMES DAILY
Qty: 180 TABLET | Refills: 3 | Status: SHIPPED | OUTPATIENT
Start: 2020-01-13 | End: 2020-09-14

## 2020-01-13 ASSESSMENT — PAIN SCALES - GENERAL: PAINLEVEL: NO PAIN (0)

## 2020-01-13 ASSESSMENT — MIFFLIN-ST. JEOR: SCORE: 1625.8

## 2020-01-13 NOTE — LETTER
January 16, 2020      Rmao Adams  [address] [place] [address]        Dear ,    We are writing to inform you of your test results.    Kidney function is a bit better,  Blood sugar a bit worse. cholesterol a bit worse.  Keep working on diet and exercise  Recheck a1c diabetes test in 3 months,  Cholesterol in 1 year.     If you have any questions or concerns, please call the clinic at the number listed above.       Sincerely,      Santiago oK MD/sc    Resulted Orders   HEMOGLOBIN A1C   Result Value Ref Range    Hemoglobin A1C 8.7 (H) 0 - 5.6 %   TSH WITH FREE T4 REFLEX   Result Value Ref Range    TSH 1.48 0.40 - 4.00 mU/L   BASIC METABOLIC PANEL   Result Value Ref Range    Sodium 137 133 - 144 mmol/L    Potassium 4.1 3.4 - 5.3 mmol/L    Chloride 106 94 - 109 mmol/L    Carbon Dioxide 27 20 - 32 mmol/L    Anion Gap 4 3 - 14 mmol/L    Glucose 138 (H) 70 - 99 mg/dL    Urea Nitrogen 14 7 - 30 mg/dL    Creatinine 0.70 0.66 - 1.25 mg/dL    GFR Estimate >90 >60 mL/min/[1.73_m2]    GFR Estimate If Black >90 >60 mL/min/[1.73_m2]    Calcium 9.3 8.5 - 10.1 mg/dL         Albumin Random Urine Quantitative with Creat Ratio   Result Value Ref Range    Creatinine Urine 224 mg/dL    Albumin Urine mg/L 38 mg/L    Albumin Urine mg/g Cr 17.19 (H) 0 - 17 mg/g Cr   Lipid panel reflex to direct LDL   Result Value Ref Range    Cholesterol 189 <200 mg/dL    Triglycerides 110 <150 mg/dL    HDL Cholesterol 46 >39 mg/dL    LDL Cholesterol Calculated 121 (H) <100 mg/dL      Comment:      Above desirable:  100-129 mg/dl  Borderline High:  130-159 mg/dL  High:             160-189 mg/dL  Very high:       >189 mg/dl      Non HDL Cholesterol 143 (H) <130 mg/dL      Comment:      Above Desirable:  130-159 mg/dl  Borderline high:  160-189 mg/dl  High:             190-219 mg/dl  Very high:       >219 mg/dl

## 2020-01-13 NOTE — TELEPHONE ENCOUNTER
"LOSARTAN POTASSIUM 100MG TABS      Last Written Prescription Date:  12/19/18  Last Fill Quantity: 90,   # refills: 3  Last Office Visit: 1/13/20  Future Office visit:       Requested Prescriptions   Pending Prescriptions Disp Refills     losartan (COZAAR) 100 MG tablet [Pharmacy Med Name: LOSARTAN POTASSIUM 100MG TABS] 90 tablet 3     Sig: TAKE ONE TABLET BY MOUTH ONCE DAILY       Angiotensin-II Receptors Failed - 1/13/2020 12:17 PM        Failed - Last blood pressure under 140/90 in past 12 months     BP Readings from Last 3 Encounters:   01/13/20 (!) 153/85   12/19/18 116/68   07/16/18 142/88                 Failed - Normal serum creatinine on file in past 12 months     Recent Labs   Lab Test 12/19/18  1059   CR 0.62*             Failed - Normal serum potassium on file in past 12 months     Recent Labs   Lab Test 12/19/18  1059   POTASSIUM 4.1                    Passed - Recent (12 mo) or future (30 days) visit within the authorizing provider's specialty     Patient has had an office visit with the authorizing provider or a provider within the authorizing providers department within the previous 12 mos or has a future within next 30 days. See \"Patient Info\" tab in inbasket, or \"Choose Columns\" in Meds & Orders section of the refill encounter.              Passed - Medication is active on med list        Passed - Patient is age 18 or older          "

## 2020-01-14 NOTE — TELEPHONE ENCOUNTER
Routing refill request to provider for review/approval because:  1/13/20 office visit documentation has not been finalized.  Jose Sanchez RN

## 2020-01-15 RX ORDER — LOSARTAN POTASSIUM 100 MG/1
TABLET ORAL
Qty: 90 TABLET | Refills: 3 | Status: SHIPPED | OUTPATIENT
Start: 2020-01-15 | End: 2021-01-25

## 2020-01-19 VITALS
SYSTOLIC BLOOD PRESSURE: 138 MMHG | WEIGHT: 195.2 LBS | BODY MASS INDEX: 28.91 KG/M2 | HEART RATE: 74 BPM | RESPIRATION RATE: 16 BRPM | DIASTOLIC BLOOD PRESSURE: 75 MMHG | TEMPERATURE: 98.2 F | HEIGHT: 69 IN

## 2020-01-19 NOTE — PROGRESS NOTES
SUBJECTIVE:                                                    Ramo Adams is a 74 year old male who presents to clinic today for the following health issues:    Diabetes Follow-up      How often are you checking your blood sugar? One time daily  What time of day are you checking your blood sugars (select all that apply)?  Before meals  Have you had any blood sugars above 200?  No  Have you had any blood sugars below 70?  No    What symptoms do you notice when your blood sugar is low?  None    What concerns do you have today about your diabetes? None     Do you have any of these symptoms? (Select all that apply)  No numbness or tingling in feet.  No redness, sores or blisters on feet.  No complaints of excessive thirst.  No reports of blurry vision.  No significant changes to weight.     Have you had a diabetic eye exam in the last 12 months? Yes- Date of last eye exam: 09/30/2019    Diabetes Management Resources    Hyperlipidemia Follow-Up      Are you regularly taking any medication or supplement to lower your cholesterol?   No he is prescribed it but is not taking it.    Are you having muscle aches or other side effects that you think could be caused by your cholesterol lowering medication?  No    Hypertension Follow-up      Do you check your blood pressure regularly outside of the clinic? No     Are you following a low salt diet? Yes    Are your blood pressures ever more than 140 on the top number (systolic) OR more   than 90 on the bottom number (diastolic), for example 140/90? Not sure    BP Readings from Last 2 Encounters:   01/19/20 138/75   12/19/18 116/68     Hemoglobin A1C (%)   Date Value   01/13/2020 8.7 (H)   12/19/2018 8.6 (H)     LDL Cholesterol Calculated (mg/dL)   Date Value   01/13/2020 121 (H)   12/21/2017 112 (H)     LDL Cholesterol Direct (mg/dL)   Date Value   12/19/2018 121 (H)         How many servings of fruits and vegetables do you eat daily?  0-1    On average, how many sweetened  beverages do you drink each day (Examples: soda, juice, sweet tea, etc.  Do NOT count diet or artificially sweetened beverages)?   0    How many days per week do you miss taking your medication? He does take his medication every day aside from the atorvastatin.        Problem list and histories reviewed & adjusted, as indicated.  Additional history:     Patient Active Problem List   Diagnosis     Hyperlipidemia LDL goal <100     Overweight     Essential hypertension with goal blood pressure less than 130/85     Type 2 diabetes mellitus with diabetic neuropathy, with long-term current use of insulin (H)     Past Surgical History:   Procedure Laterality Date     SPECIMEN TO PATHOLOGY  2008    colon resection - 6-7 inches       Social History     Tobacco Use     Smoking status: Never Smoker     Smokeless tobacco: Never Used   Substance Use Topics     Alcohol use: Yes     Comment: very rare - 1 beer once a month or less     Family History   Problem Relation Age of Onset     Diabetes Mother      Alzheimer Disease Father      Diabetes Sister      Diabetes Maternal Grandmother          Current Outpatient Medications   Medication Sig Dispense Refill     aspirin 81 MG tablet Take 1 tablet by mouth daily.       blood glucose (NO BRAND SPECIFIED) test strip Use to test blood sugar daily or as directed. 30 strip 11     blood glucose monitoring (PILY CONTOUR) test strip USE TO TEST BLOOD SUGAR TWO TIMES A DAY OR AS DIRECTED 200 each 3     glipiZIDE (GLUCOTROL) 5 MG tablet Take 1 tablet (5 mg) by mouth 2 times daily TAKE ONE TABLET BY MOUTH EVERY DAY WITH DINNER 180 tablet 3     insulin pen needle (ULTICARE SHORT) 31G X 8 MM miscellaneous USE TWICE A DAY WITH LANTUS SOLOSTAR  each 3     LANTUS SOLOSTAR 100 UNIT/ML soln INJECT 20 UNITS UNDER THE SKIN TWICE DAILY 75 mL 0     Multiple Vitamins-Minerals (CENTURY SENIOR PO) Take 1 tablet by mouth daily.       atorvastatin (LIPITOR) 20 MG tablet Take 1 tablet (20 mg) by mouth  "daily (Patient not taking: Reported on 1/13/2020) 90 tablet 3     losartan (COZAAR) 100 MG tablet TAKE ONE TABLET BY MOUTH ONCE DAILY 90 tablet 3     Allergies   Allergen Reactions     Ativan      Respiratory arrest     Morphine Sulfate      Respiratory Arrest     Metformin GI Disturbance       ROS:  Constitutional, HEENT, cardiovascular, pulmonary, gi and gu systems are negative, except as otherwise noted.    OBJECTIVE:                                                    /75   Pulse 74   Temp 98.2  F (36.8  C) (Tympanic)   Resp 16   Ht 1.753 m (5' 9\")   Wt 88.5 kg (195 lb 3.2 oz)   BMI 28.83 kg/m   Body mass index is 28.83 kg/m .   GENERAL: healthy, alert, well nourished, well hydrated, no distress  HENT: ear canals- normal; TMs- normal; Nose- normal; Mouth- no ulcers, no lesions  NECK: no tenderness, no adenopathy, no asymmetry, no masses, no stiffness; thyroid- normal to palpation  RESP: lungs clear to auscultation - no rales, no rhonchi, no wheezes  CV: regular rates and rhythm, normal S1 S2, no S3 or S4 and no murmur, no click or rub -  ABDOMEN: soft, no tenderness, no  hepatosplenomegaly, no masses, normal bowel sounds       ASSESSMENT/PLAN:                                                      (Z13.220) Screening for hyperlipidemia  Good control.  Continue currant medications, continue to monito    Greif reaction:   Doing poorly with wifes death.  Feels [facility] providers  Let his wife down.    (E11.40,  Z79.4) Type 2 diabetes mellitus with diabetic neuropathy, with long-term current use of insulin (H)  Comment: poor control.    Lab Results   Component Value Date    A1C 8.7 01/13/2020    A1C 8.6 12/19/2018    A1C 8.9 07/16/2018    A1C 9.6 12/21/2017    A1C 9.1 08/23/2017     Check twice a day call me in 1 week with resultsl  Plan: HEMOGLOBIN A1C, TSH WITH FREE T4 REFLEX         (I10) Essential hypertension with goal blood pressure less than 130/85  Good control.  Continue currant medications, " continue to monito    Plan: BASIC METABOLIC PANEL, Albumin Random Urine         Quantitative with Creat Ratio       reports that he has never smoked. He has never used smokeless tobacco.      Weight management plan: Discussed healthy diet and exercise guidelines      [facility]

## 2020-01-22 ENCOUNTER — TELEPHONE (OUTPATIENT)
Dept: FAMILY MEDICINE | Facility: CLINIC | Age: 75
End: 2020-01-22

## 2020-01-22 NOTE — TELEPHONE ENCOUNTER
CHRISSY:    Ho was scammed last evening via a phone call.  States he was sleeping and received a call; asking him questions regarding his MD, if he had back pain, knee pain, etc.  He gave out his Medicare Number because they sounded so authentic.  He did call Medicare and the [employer].  Will procedure to go to his belcher today and ankit his accounts, but wanted us to know.    .Sary Rodriguez

## 2020-02-10 ENCOUNTER — OFFICE VISIT (OUTPATIENT)
Dept: FAMILY MEDICINE | Facility: CLINIC | Age: 75
End: 2020-02-10
Payer: COMMERCIAL

## 2020-02-10 VITALS
HEART RATE: 79 BPM | DIASTOLIC BLOOD PRESSURE: 89 MMHG | BODY MASS INDEX: 28.9 KG/M2 | RESPIRATION RATE: 16 BRPM | WEIGHT: 195.7 LBS | TEMPERATURE: 97.8 F | SYSTOLIC BLOOD PRESSURE: 152 MMHG

## 2020-02-10 DIAGNOSIS — L72.3 SEBACEOUS CYST: ICD-10-CM

## 2020-02-10 DIAGNOSIS — Z23 NEED FOR PROPHYLACTIC VACCINATION AND INOCULATION AGAINST INFLUENZA: Primary | ICD-10-CM

## 2020-02-10 PROCEDURE — G0008 ADMIN INFLUENZA VIRUS VAC: HCPCS | Performed by: FAMILY MEDICINE

## 2020-02-10 PROCEDURE — 90662 IIV NO PRSV INCREASED AG IM: CPT | Performed by: FAMILY MEDICINE

## 2020-02-10 PROCEDURE — 99213 OFFICE O/P EST LOW 20 MIN: CPT | Mod: 25 | Performed by: FAMILY MEDICINE

## 2020-02-10 RX ORDER — CEPHALEXIN 500 MG/1
500 CAPSULE ORAL 3 TIMES DAILY
Qty: 21 CAPSULE | Refills: 0 | Status: SHIPPED | OUTPATIENT
Start: 2020-02-10 | End: 2020-02-19

## 2020-02-10 ASSESSMENT — PAIN SCALES - GENERAL: PAINLEVEL: NO PAIN (0)

## 2020-02-10 NOTE — PROGRESS NOTES
SUBJECTIVE:                                                    Ramo Adams is a 74 year old male who presents to clinic today for the following health issues:    Concern - spot on his back  Onset: a week ago    Description:   He says they there is a blister on his upper back. It did seem to pop and was oozing. He says it has been itching. He says it was a little over and inch in diameter.     Intensity: moderate    Progression of Symptoms:  same    Accompanying Signs & Symptoms:  none    Previous history of similar problem:   none    Precipitating factors:   Worsened by: none    Alleviating factors:  Improved by: He says it is better, because the pressure got relieved.     Therapies Tried and outcome: He was applying the oviedo nelly and gauze. This seemed to help soften the skin and it popped and the pressure relieved.     -He is also wanting to discuss his insulin and his drivers license. He was unaware with the new license that he needs to state that he is insulin dependent.     Problem list and histories reviewed & adjusted, as indicated.  Additional history:     Patient Active Problem List   Diagnosis     Hyperlipidemia LDL goal <100     Overweight     Essential hypertension with goal blood pressure less than 130/85     Type 2 diabetes mellitus with diabetic neuropathy, with long-term current use of insulin (H)     Past Surgical History:   Procedure Laterality Date     SPECIMEN TO PATHOLOGY  2008    colon resection - 6-7 inches       Social History     Tobacco Use     Smoking status: Never Smoker     Smokeless tobacco: Never Used   Substance Use Topics     Alcohol use: Yes     Comment: very rare - 1 beer once a month or less     Family History   Problem Relation Age of Onset     Diabetes Mother      Alzheimer Disease Father      Diabetes Sister      Diabetes Maternal Grandmother          Current Outpatient Medications   Medication Sig Dispense Refill     aspirin 81 MG tablet Take 1 tablet by mouth daily.        atorvastatin (LIPITOR) 20 MG tablet Take 1 tablet (20 mg) by mouth daily 90 tablet 3     blood glucose (NO BRAND SPECIFIED) test strip Use to test blood sugar daily or as directed. 30 strip 11     blood glucose monitoring (PILY CONTOUR) test strip USE TO TEST BLOOD SUGAR TWO TIMES A DAY OR AS DIRECTED 200 each 3     glipiZIDE (GLUCOTROL) 5 MG tablet Take 1 tablet (5 mg) by mouth 2 times daily TAKE ONE TABLET BY MOUTH EVERY DAY WITH DINNER 180 tablet 3     insulin pen needle (ULTICARE SHORT) 31G X 8 MM miscellaneous USE TWICE A DAY WITH LANTUS SOLOSTAR  each 3     LANTUS SOLOSTAR 100 UNIT/ML soln INJECT 20 UNITS UNDER THE SKIN TWICE DAILY 75 mL 0     losartan (COZAAR) 100 MG tablet TAKE ONE TABLET BY MOUTH ONCE DAILY 90 tablet 3     Multiple Vitamins-Minerals (CENTURY SENIOR PO) Take 1 tablet by mouth daily.       Allergies   Allergen Reactions     Ativan      Respiratory arrest     Morphine Sulfate      Respiratory Arrest     Metformin GI Disturbance       ROS:  Constitutional, HEENT, cardiovascular, pulmonary, gi and gu systems are negative, except as otherwise noted.    OBJECTIVE:                                                    BP (!) 152/89   Pulse 79   Temp 97.8  F (36.6  C) (Tympanic)   Resp 16   Wt 88.8 kg (195 lb 11.2 oz)   BMI 28.90 kg/m   Body mass index is 28.9 kg/m .   GENERAL: healthy, alert, well nourished, well hydrated, no distress  HENT: ear canals- normal; TMs- normal; Nose- normal; Mouth- no ulcers, no lesions  NECK: no tenderness, no adenopathy, no asymmetry, no masses, no stiffness; thyroid- normal to palpation  RESP: lungs clear to auscultation - no rales, no rhonchi, no wheezes  CV: regular rates and rhythm, normal S1 S2, no S3 or S4 and no murmur, no click or rub -  ABDOMEN: soft, no tenderness, no  hepatosplenomegaly, no masses, normal bowel sounds  BACK:  Red sebacous cyast draining  No sign of abscess or flutuance. Has 5 cm of surrounding erythema.     ASSESSMENT/PLAN:                                                       (Z23) Need for prophylactic vaccination and inoculation against influenza  (primary encounter diagnosis)  Plan: INFLUENZA (HIGH DOSE) 3 VALENT VACCINE [26345],        ADMIN INFLUENZA (For MEDICARE Patients ONLY)         []    (L72.3) Sebaceous cyst with cellulitisi   cephALEXin (KEFLEX) 500 MG         capsule     reports that he has never smoked. He has never used smokeless tobacco.      Weight management plan: Discussed healthy diet and exercise guidelines      [facility]

## 2020-02-17 ENCOUNTER — TELEPHONE (OUTPATIENT)
Dept: FAMILY MEDICINE | Facility: CLINIC | Age: 75
End: 2020-02-17

## 2020-02-17 NOTE — TELEPHONE ENCOUNTER
"Ho reports that the boil keeps weeping as it opens up after soaking in hot water for 40-45 minutes with Dreft. He also sprinkles 1/2 cup of salt in the tub water. Some red and brown color to the drainage. He thinks it is getting better.\"Pink instead of purple.\" He said that it is getting smaller. He said it does not hurt anymore. Swelling has gone down. It is itchy.  He has been taking 3 keflex capsules daily for 7 days. He said he will finish them today.   He is leaving to go out of the country on 2/21/20 so he is trying to make sure it is healed before he leaves. He is seeing Dr. Ko on 2/19/20.  He wonders if he should get epsom salts to soak in?  How do you advise care until he sees Dr. Ko on 2/19/20?   Thank you.   Jose Sanchez RN    "

## 2020-02-17 NOTE — TELEPHONE ENCOUNTER
The best treatment is heat - whether it be soaking in a warm bath or applying a warm compress. Epsom salt can help but is not absolutely necessary - it is the heat that helps the most.   It sounds like things are going the right direction if he is noting it seems smaller and is draining - draining is a good sign  He can keep his appointment for the 19th - reasons to be seen sooner would be if it stopped draining and started to become more tender or increase in size again  Carin

## 2020-02-17 NOTE — TELEPHONE ENCOUNTER
Reason for call:  Patient reporting a symptom    Symptom or request: Patient has boil on his back that he saw Dr. Ko for on 02/10.  He has an appt scheduled again on Wed for this because he is going out of town this weekend.  He has been soaking in a tub with Dreft soap to help it and he is asking if this is ok to be doing?    Duration (how long have symptoms been present): 1 week    Have you been treated for this before? Yes    Additional comments:     Phone Number patient can be reached at:  Home number on file [phone] (home)    Best Time:  Any    Can we leave a detailed message on this number:  YES    Call taken on 2/17/2020 at 8:47 AM by Renea Hurley

## 2020-02-19 ENCOUNTER — OFFICE VISIT (OUTPATIENT)
Dept: FAMILY MEDICINE | Facility: CLINIC | Age: 75
End: 2020-02-19
Payer: COMMERCIAL

## 2020-02-19 VITALS
TEMPERATURE: 97.2 F | SYSTOLIC BLOOD PRESSURE: 144 MMHG | RESPIRATION RATE: 16 BRPM | HEART RATE: 73 BPM | DIASTOLIC BLOOD PRESSURE: 86 MMHG | WEIGHT: 197.2 LBS | BODY MASS INDEX: 29.12 KG/M2

## 2020-02-19 DIAGNOSIS — L72.3 SEBACEOUS CYST: ICD-10-CM

## 2020-02-19 PROCEDURE — 99213 OFFICE O/P EST LOW 20 MIN: CPT | Performed by: FAMILY MEDICINE

## 2020-02-19 RX ORDER — CEPHALEXIN 500 MG/1
500 CAPSULE ORAL 3 TIMES DAILY
Qty: 21 CAPSULE | Refills: 0 | Status: SHIPPED | OUTPATIENT
Start: 2020-02-19 | End: 2021-01-25

## 2020-02-19 ASSESSMENT — PAIN SCALES - GENERAL: PAINLEVEL: NO PAIN (0)

## 2020-02-19 NOTE — PROGRESS NOTES
SUBJECTIVE:                                                    Ramo Adams is a 74 year old male who presents to clinic today for the following health issues:    Chief Complaint   Patient presents with     RECHECK     boil on upper back     -He says that it has gotten a lot better. It is still a little red but so much better. Last dose of antibiotic was Monday.    Problem list and histories reviewed & adjusted, as indicated.  Additional history:     Patient Active Problem List   Diagnosis     Hyperlipidemia LDL goal <100     Overweight     Essential hypertension with goal blood pressure less than 130/85     Type 2 diabetes mellitus with diabetic neuropathy, with long-term current use of insulin (H)     Past Surgical History:   Procedure Laterality Date     SPECIMEN TO PATHOLOGY  2008    colon resection - 6-7 inches       Social History     Tobacco Use     Smoking status: Never Smoker     Smokeless tobacco: Never Used   Substance Use Topics     Alcohol use: Yes     Comment: very rare - 1 beer once a month or less     Family History   Problem Relation Age of Onset     Diabetes Mother      Alzheimer Disease Father      Diabetes Sister      Diabetes Maternal Grandmother          Current Outpatient Medications   Medication Sig Dispense Refill     aspirin 81 MG tablet Take 1 tablet by mouth daily.       atorvastatin (LIPITOR) 20 MG tablet Take 1 tablet (20 mg) by mouth daily 90 tablet 3     blood glucose (NO BRAND SPECIFIED) test strip Use to test blood sugar daily or as directed. 30 strip 11     blood glucose monitoring (PILY CONTOUR) test strip USE TO TEST BLOOD SUGAR TWO TIMES A DAY OR AS DIRECTED 200 each 3     cephALEXin 500 MG PO capsule Take 1 capsule (500 mg) by mouth 3 times daily 21 capsule 0     glipiZIDE (GLUCOTROL) 5 MG tablet Take 1 tablet (5 mg) by mouth 2 times daily TAKE ONE TABLET BY MOUTH EVERY DAY WITH DINNER 180 tablet 3     insulin pen needle (ULTICARE SHORT) 31G X 8 MM miscellaneous USE TWICE A  DAY WITH LANTUS SOLOSTAR  each 3     LANTUS SOLOSTAR 100 UNIT/ML soln INJECT 20 UNITS UNDER THE SKIN TWICE DAILY 75 mL 0     losartan (COZAAR) 100 MG tablet TAKE ONE TABLET BY MOUTH ONCE DAILY 90 tablet 3     Multiple Vitamins-Minerals (CENTURY SENIOR PO) Take 1 tablet by mouth daily.       Allergies   Allergen Reactions     Ativan      Respiratory arrest     Morphine Sulfate      Respiratory Arrest     Metformin GI Disturbance       ROS:  Constitutional, HEENT, cardiovascular, pulmonary, gi and gu systems are negative, except as otherwise noted.    OBJECTIVE:                                                    BP (!) 144/86   Pulse 73   Temp 97.2  F (36.2  C) (Tympanic)   Resp 16   Wt 89.4 kg (197 lb 3.2 oz)   BMI 29.12 kg/m   Body mass index is 29.12 kg/m .   GENERAL: healthy, alert, well nourished, well hydrated, no distress  HENT: ear canals- normal; TMs- normal; Nose- normal; Mouth- no ulcers, no lesions  NECK: no tenderness, no adenopathy, no asymmetry, no masses, no stiffness; thyroid- normal to palpation  RESP: lungs clear to auscultation - no rales, no rhonchi, no wheezes  CV: regular rates and rhythm, normal S1 S2, no S3 or S4 and no murmur, no click or rub -  ABDOMEN: soft, no tenderness, no  hepatosplenomegaly, no masses, normal bowel sounds  Skin has much less erythema  No flutuanct of abscess.      ASSESSMENT/PLAN:                                                    (L72.3) Sebaceous cyst  He is traveling ot of the country this week  I sugges he have another round of antibitic on hand as he is at risk for cellulites again.  Until this is completely healed.  Avoid swimming  While traveling this trip.   Comment: with surrounding celllulitis   Plan: cephALEXin 500 MG PO capsule            reports that he has never smoked. He has never used smokeless tobacco.      Weight management plan: Discussed healthy diet and exercise guidelines      [facility]

## 2020-06-24 DIAGNOSIS — E11.40 TYPE 2 DIABETES MELLITUS WITH DIABETIC NEUROPATHY, WITH LONG-TERM CURRENT USE OF INSULIN (H): ICD-10-CM

## 2020-06-24 DIAGNOSIS — Z79.4 TYPE 2 DIABETES MELLITUS WITH DIABETIC NEUROPATHY, WITH LONG-TERM CURRENT USE OF INSULIN (H): ICD-10-CM

## 2020-06-24 RX ORDER — INSULIN GLARGINE 100 [IU]/ML
INJECTION, SOLUTION SUBCUTANEOUS
Qty: 45 ML | Refills: 0 | Status: SHIPPED | OUTPATIENT
Start: 2020-06-24 | End: 2020-09-11

## 2020-06-24 NOTE — TELEPHONE ENCOUNTER
Prescription approved per [facility] [place] [facility].  Jose Sanchez RN

## 2020-07-27 ENCOUNTER — TELEPHONE (OUTPATIENT)
Dept: FAMILY MEDICINE | Facility: CLINIC | Age: 75
End: 2020-07-27

## 2020-07-27 NOTE — TELEPHONE ENCOUNTER
Statin therapy is recommended for patients 40-75 years of age with a diagnosis of diabetes and low-dose aspirin therapy is recommended in those with diabetes and a history of atherosclerotic cardiovascular disease.  Does patient have a diagnosis of diabetes? Yes - Patient has a diagnosis of diabetes  Is patient prescribed a statin? Yes - Patient is prescribed a statin  Statin Assessment: Statin Indicated  The 10-year ASCVD risk score (Josias WRAY Jr., et al., 2013) is: 50.3%    Values used to calculate the score:      Age: 75 years      Sex: Male      Is Non- : No      Diabetic: Yes      Tobacco smoker: No      Systolic Blood Pressure: 144 mmHg      Is BP treated: Yes      HDL Cholesterol: 46 mg/dL      Total Cholesterol: 189 mg/dL   Statin Recommendation: Continue statin - no change in therapy  Reason for Recommendation: Ho told me a year ago that he was not taking his statin.  Do you want to discuss restarting a statin or change to intentionally not prescribed?  Aspirin Assessment:  Aspirin indicated  Aspirin Recomendation: Continue aspirin - no change in therapy    Reason for Recommendation:   Is provider follow-up required? Yes- Provider follow-up is  required  Is pharmacist follow-up required? Yes - Pharmacist follow-up is required  Follow-Up Plan: Please let me know how you would like to proceed or if you would like me to initiate therapy.  Patient outreach completed by:   Maryjane Eckert, PharmD  [facility]  [phone]

## 2020-07-29 NOTE — TELEPHONE ENCOUNTER
No don't restart.  I will discuss with him when he comes in next.      He is still morning the death of his wife.    Santiago

## 2020-09-11 DIAGNOSIS — E11.40 TYPE 2 DIABETES MELLITUS WITH DIABETIC NEUROPATHY, WITH LONG-TERM CURRENT USE OF INSULIN (H): ICD-10-CM

## 2020-09-11 DIAGNOSIS — Z79.4 TYPE 2 DIABETES MELLITUS WITH DIABETIC NEUROPATHY, WITH LONG-TERM CURRENT USE OF INSULIN (H): ICD-10-CM

## 2020-09-11 RX ORDER — INSULIN GLARGINE 100 [IU]/ML
INJECTION, SOLUTION SUBCUTANEOUS
Qty: 45 ML | Refills: 0 | Status: SHIPPED | OUTPATIENT
Start: 2020-09-11 | End: 2021-01-25

## 2020-09-11 NOTE — TELEPHONE ENCOUNTER
Medication is being filled for 1 time refill only due to:  Patient needs labs hgba1c. Future labs ordered yes.   Jose Sanchez RN

## 2020-09-14 ENCOUNTER — TELEPHONE (OUTPATIENT)
Dept: FAMILY MEDICINE | Facility: CLINIC | Age: 75
End: 2020-09-14

## 2020-09-14 DIAGNOSIS — Z79.4 TYPE 2 DIABETES MELLITUS WITH DIABETIC NEUROPATHY, WITH LONG-TERM CURRENT USE OF INSULIN (H): ICD-10-CM

## 2020-09-14 DIAGNOSIS — E11.40 TYPE 2 DIABETES MELLITUS WITH DIABETIC NEUROPATHY, WITH LONG-TERM CURRENT USE OF INSULIN (H): ICD-10-CM

## 2020-09-14 RX ORDER — GLIPIZIDE 5 MG/1
5 TABLET ORAL DAILY
Qty: 90 TABLET | Refills: 3 | Status: SHIPPED | OUTPATIENT
Start: 2020-09-14 | End: 2024-01-29

## 2020-09-14 NOTE — TELEPHONE ENCOUNTER
Ramo Adams was identified as being non-adherent to his/her Glipizide (medication name) in the last 30-90 days.  Reason(s) identified for non-adherence: other he states he is only taking Glipizide once a day in the evening  Intervention(s) offered to assist patient with adherence:  Requested change in prescription please change to 1QD instead of 1BID; I pended the order with the 1QD sig    Recommendation to Provider: Update Glipizide order to every day dosing  Recommended follow-up: NA  Completed by: Maryjane Eckert, PharmD  [facility]  [phone]

## 2021-01-25 ENCOUNTER — VIRTUAL VISIT (OUTPATIENT)
Dept: FAMILY MEDICINE | Facility: CLINIC | Age: 76
End: 2021-01-25
Payer: COMMERCIAL

## 2021-01-25 DIAGNOSIS — Z79.4 TYPE 2 DIABETES MELLITUS WITH DIABETIC NEUROPATHY, WITH LONG-TERM CURRENT USE OF INSULIN (H): ICD-10-CM

## 2021-01-25 DIAGNOSIS — I10 ESSENTIAL HYPERTENSION WITH GOAL BLOOD PRESSURE LESS THAN 130/85: ICD-10-CM

## 2021-01-25 DIAGNOSIS — E11.40 TYPE 2 DIABETES MELLITUS WITH DIABETIC NEUROPATHY, WITH LONG-TERM CURRENT USE OF INSULIN (H): ICD-10-CM

## 2021-01-25 PROCEDURE — 99214 OFFICE O/P EST MOD 30 MIN: CPT | Mod: 95 | Performed by: FAMILY MEDICINE

## 2021-01-25 RX ORDER — LOSARTAN POTASSIUM 100 MG/1
100 TABLET ORAL DAILY
Qty: 90 TABLET | Refills: 3 | Status: SHIPPED | OUTPATIENT
Start: 2021-01-25

## 2021-01-25 NOTE — PROGRESS NOTES
Ho is a 75 year old who is being evaluated via a billable telephone visit.      What phone number would you like to be contacted at? [phone]  How would you like to obtain your AVS? Mail a copy  Assessment & Plan     Type 2 diabetes mellitus with diabetic neuropathy, with long-term current use of insulin (H)  Lab Results   Component Value Date    A1C 8.7 01/13/2020    A1C 8.6 12/19/2018    A1C 8.9 07/16/2018    A1C 9.6 12/21/2017    A1C 9.1 08/23/2017     Poor control will in cresed her long term. From 16 to 20 units.  Twice daily   - insulin glargine (LANTUS SOLOSTAR) 100 UNIT/ML pen; Inject 20 Units Subcutaneous 2 times daily  - **A1C FUTURE anytime; Future  Discussed adding byetta or jardiance   Essential hypertension with goal blood pressure less than 130/85  BP Readings from Last 3 Encounters:   02/19/20 (!) 144/86   02/10/20 (!) 152/89   01/19/20 138/75     Will increase losartan froom 50 mg to 100mg.   - losartan (COZAAR) 100 MG tablet; Take 1 tablet (100 mg) by mouth daily    6 minutes spent on the date of the encounter doing chart review, history and exam, documentation and further activities as noted above    Santiago Ko MD  [facility] OTILIO Teague is a 75 year old who presents to clinic today for the following health issues     HPI       Diabetes Follow-up    How often are you checking your blood sugar? Three times daily  Blood sugar testing frequency justification:  Uncontrolled diabetes  What time of day are you checking your blood sugars (select all that apply)?  Before and after meals  Have you had any blood sugars above 200?  No  Have you had any blood sugars below 70?  No    What symptoms do you notice when your blood sugar is low?  None    What concerns do you have today about your diabetes? None     Do you have any of these symptoms? (Select all that apply)  No numbness or tingling in feet.  No redness, sores or blisters on feet.  No complaints of excessive  thirst.  No reports of blurry vision.  No significant changes to weight.    Have you had a diabetic eye exam in the last 12 months? No          Hyperlipidemia Follow-Up      Are you regularly taking any medication or supplement to lower your cholesterol?   No    Are you having muscle aches or other side effects that you think could be caused by your cholesterol lowering medication?  No    Hypertension Follow-up      Do you check your blood pressure regularly outside of the clinic? Yes     Are you following a low salt diet? No    Are your blood pressures ever more than 140 on the top number (systolic) OR more   than 90 on the bottom number (diastolic), for example 140/90? No    BP Readings from Last 2 Encounters:   02/19/20 (!) 144/86   02/10/20 (!) 152/89     Hemoglobin A1C (%)   Date Value   01/13/2020 8.7 (H)   12/19/2018 8.6 (H)     LDL Cholesterol Calculated (mg/dL)   Date Value   01/13/2020 121 (H)   12/21/2017 112 (H)     LDL Cholesterol Direct (mg/dL)   Date Value   12/19/2018 121 (H)         How many servings of fruits and vegetables do you eat daily?  2-3    On average, how many sweetened beverages do you drink each day (Examples: soda, juice, sweet tea, etc.  Do NOT count diet or artificially sweetened beverages)?   0    How many days per week do you exercise enough to make your heart beat faster? 7    How many minutes a day do you exercise enough to make your heart beat faster? 60 or more    How many days per week do you miss taking your medication? 0        Review of Systems   Constitutional, HEENT, cardiovascular, pulmonary, gi and gu systems are negative, except as otherwise noted.      Objective           Vitals:  No vitals were obtained today due to virtual visit.    Physical Exam   healthy, alert and no distress  PSYCH: Alert and oriented times 3; coherent speech, normal   rate and volume, able to articulate logical thoughts, able   to abstract reason, no tangential thoughts, no hallucinations   or  delusions  His affect is normal  RESP: No cough, no audible wheezing, able to talk in full sentences  Remainder of exam unable to be completed due to telephone visits            Phone call duration: 9 minutes

## 2021-01-26 DIAGNOSIS — E11.40 TYPE 2 DIABETES MELLITUS WITH DIABETIC NEUROPATHY, WITH LONG-TERM CURRENT USE OF INSULIN (H): ICD-10-CM

## 2021-01-26 DIAGNOSIS — Z79.4 TYPE 2 DIABETES MELLITUS WITH DIABETIC NEUROPATHY, WITH LONG-TERM CURRENT USE OF INSULIN (H): ICD-10-CM

## 2021-01-28 RX ORDER — METHYLPREDNISOLONE SODIUM SUCCINATE 125 MG/2ML
INJECTION, POWDER, FOR SOLUTION INTRAMUSCULAR; INTRAVENOUS
Qty: 200 EACH | Refills: 3 | Status: SHIPPED | OUTPATIENT
Start: 2021-01-28

## 2021-05-26 ENCOUNTER — RECORDS - HEALTHEAST (OUTPATIENT)
Dept: ADMINISTRATIVE | Facility: CLINIC | Age: 76
End: 2021-05-26

## 2021-05-28 ENCOUNTER — TELEPHONE (OUTPATIENT)
Dept: FAMILY MEDICINE | Facility: CLINIC | Age: 76
End: 2021-05-28

## 2021-05-28 DIAGNOSIS — E78.5 HYPERLIPIDEMIA LDL GOAL <100: Primary | ICD-10-CM

## 2021-05-28 DIAGNOSIS — I10 ESSENTIAL HYPERTENSION WITH GOAL BLOOD PRESSURE LESS THAN 130/85: ICD-10-CM

## 2021-05-28 NOTE — TELEPHONE ENCOUNTER
Patient called and is very frustrated with [facility] not being in contract with [facility], patient would like to talk to Dr. Ko about what is he suppose to do about his health care.    [place]e [place], [place]

## 2021-06-01 ENCOUNTER — RECORDS - HEALTHEAST (OUTPATIENT)
Dept: ADMINISTRATIVE | Facility: CLINIC | Age: 76
End: 2021-06-01

## 2021-06-01 NOTE — TELEPHONE ENCOUNTER
Message left on patient's answering machine to call the [facility] Care Team  back.  Jose Sanchez RN

## 2021-06-02 NOTE — TELEPHONE ENCOUNTER
insulin pen needle (ULTICARE SHORT) 31G X 8 MM miscellaneous 200 each 3 1/28/2021  No   Sig: USE TWICE DAILY WITH SOLOSTAR PEN   Sent to pharmacy as: UltiCare Short Pen Needles 31G X 8 MM (insulin pen needle)   Class: E-Prescribe   Order: [id number]   E-Prescribing Status: Receipt confirmed by pharmacy (1/28/2021  3:11 PM CST)   Printout Tracking    External Result Report   Medication Administration Instructions    USE TWICE DAILY WITH SOLOSTAR PEN   Pharmacy    [facility] - [id number]

## 2021-06-02 NOTE — TELEPHONE ENCOUNTER
Pt has called back and did not know that [employer] insurance is not covered anymore, he will check with the pharmacy to see if he can can still get the pen needles for now he should be covered for 2 days for now (6 needles left)  and also will check with his insurance, to see what clinics and phar will be covered.  Pt would like to order some needles for now, Phar [facility].      Piper Martinez

## 2021-09-07 ENCOUNTER — TELEPHONE (OUTPATIENT)
Dept: FAMILY MEDICINE | Facility: CLINIC | Age: 76
End: 2021-09-07

## 2021-09-07 NOTE — TELEPHONE ENCOUNTER
Panel Management Review      Patient has the following on his problem list:     Diabetes    ASA: Passed    Last A1C  Lab Results   Component Value Date    A1C 8.7 01/13/2020    A1C 8.6 12/19/2018    A1C 8.9 07/16/2018    A1C 9.6 12/21/2017    A1C 9.1 08/23/2017     A1C tested: FAILED    Last LDL:    Lab Results   Component Value Date    CHOL 189 01/13/2020     Lab Results   Component Value Date    HDL 46 01/13/2020     Lab Results   Component Value Date     01/13/2020     Lab Results   Component Value Date    TRIG 110 01/13/2020     Lab Results   Component Value Date    CHOLHDLRATIO 4.0 06/06/2013     Lab Results   Component Value Date    NHDL 143 01/13/2020       Is the patient on a Statin? YES             Is the patient on Aspirin? YES    Medications     HMG CoA Reductase Inhibitors     atorvastatin (LIPITOR) 20 MG tablet       Salicylates     aspirin 81 MG tablet             Last three blood pressure readings:  BP Readings from Last 3 Encounters:   02/19/20 (!) 144/86   02/10/20 (!) 152/89   01/19/20 138/75       Date of last diabetes office visit: 01/25/21 virtual visit     Tobacco History:     History   Smoking Status     Never Smoker   Smokeless Tobacco     Never Used         Hypertension   Last three blood pressure readings:  BP Readings from Last 3 Encounters:   02/19/20 (!) 144/86   02/10/20 (!) 152/89   01/19/20 138/75     Blood pressure: FAILED    HTN Guidelines:  Less than 140/90      Composite cancer screening  Chart review shows that this patient is due/due soon for the following None  Summary:    Patient is due/failing the following:   Vaccines, AA screen, ACP, medicare wellness, foot exam, eye exam, BMP, lipids, microalbumin, A1C and BP CHECK    Action needed:   Patient needs office visit for medicare annual wellness and diabetes.    Type of outreach:    Sent letter.    Questions for provider review:    None                                                                                                                                     Monet Tomlinson, [facility]       Chart routed to none .

## 2021-09-07 NOTE — LETTER
[facility]  [address]  [facility] [address]  Phone: [phone]      September 7, 2021    Ramo Adams  [address]  [facility] [address]          Dear Ramo Adams    As part of [facility]'s commitment to health and wellness, we inform our patients when records indicate the need for specific health screening.    It is time for you to schedule the following:      * Medicare Annual Wellness, diabetes and blood pressure visit.        To schedule an appointment with a [facility] physician, or nurse practitioner, please call:   [facility] at [phone]    NOTE:  Please disregard this notice if you have had this procedure repeated or already made an appointment.        Sincerely,        [facility]

## 2021-09-13 ENCOUNTER — LAB REQUISITION (OUTPATIENT)
Dept: LAB | Facility: CLINIC | Age: 76
End: 2021-09-13
Payer: COMMERCIAL

## 2021-09-13 DIAGNOSIS — I10 ESSENTIAL (PRIMARY) HYPERTENSION: ICD-10-CM

## 2021-09-13 LAB
ALBUMIN SERPL-MCNC: 3.6 G/DL (ref 3.5–5)
ALP SERPL-CCNC: 86 U/L (ref 45–120)
ALT SERPL W P-5'-P-CCNC: 57 U/L (ref 0–45)
ANION GAP SERPL CALCULATED.3IONS-SCNC: 10 MMOL/L (ref 5–18)
AST SERPL W P-5'-P-CCNC: 51 U/L (ref 0–40)
BILIRUB SERPL-MCNC: 0.7 MG/DL (ref 0–1)
BUN SERPL-MCNC: 16 MG/DL (ref 8–28)
CALCIUM SERPL-MCNC: 9.3 MG/DL (ref 8.5–10.5)
CHLORIDE BLD-SCNC: 104 MMOL/L (ref 98–107)
CHOLEST SERPL-MCNC: 107 MG/DL
CO2 SERPL-SCNC: 27 MMOL/L (ref 22–31)
CREAT SERPL-MCNC: 0.78 MG/DL (ref 0.7–1.3)
GFR SERPL CREATININE-BSD FRML MDRD: 89 ML/MIN/1.73M2
GLUCOSE BLD-MCNC: 265 MG/DL (ref 70–125)
HDLC SERPL-MCNC: 23 MG/DL
LDLC SERPL CALC-MCNC: 59 MG/DL
POTASSIUM BLD-SCNC: 4.6 MMOL/L (ref 3.5–5)
PROT SERPL-MCNC: 6.4 G/DL (ref 6–8)
SODIUM SERPL-SCNC: 141 MMOL/L (ref 136–145)
TRIGL SERPL-MCNC: 123 MG/DL

## 2021-09-13 PROCEDURE — 80053 COMPREHEN METABOLIC PANEL: CPT | Mod: ORL | Performed by: FAMILY MEDICINE

## 2021-09-13 PROCEDURE — 80061 LIPID PANEL: CPT | Mod: ORL | Performed by: FAMILY MEDICINE

## 2022-01-20 ENCOUNTER — LAB REQUISITION (OUTPATIENT)
Dept: LAB | Facility: CLINIC | Age: 77
End: 2022-01-20
Payer: COMMERCIAL

## 2022-01-20 DIAGNOSIS — I25.10 ATHEROSCLEROTIC HEART DISEASE OF NATIVE CORONARY ARTERY WITHOUT ANGINA PECTORIS: ICD-10-CM

## 2022-01-20 LAB
ALBUMIN SERPL-MCNC: 4 G/DL (ref 3.5–5)
ALP SERPL-CCNC: 107 U/L (ref 45–120)
ALT SERPL W P-5'-P-CCNC: 16 U/L (ref 0–45)
ANION GAP SERPL CALCULATED.3IONS-SCNC: 12 MMOL/L (ref 5–18)
AST SERPL W P-5'-P-CCNC: 17 U/L (ref 0–40)
BILIRUB SERPL-MCNC: 0.9 MG/DL (ref 0–1)
BUN SERPL-MCNC: 15 MG/DL (ref 8–28)
CALCIUM SERPL-MCNC: 9.6 MG/DL (ref 8.5–10.5)
CHLORIDE BLD-SCNC: 106 MMOL/L (ref 98–107)
CO2 SERPL-SCNC: 21 MMOL/L (ref 22–31)
CREAT SERPL-MCNC: 0.8 MG/DL (ref 0.7–1.3)
GFR SERPL CREATININE-BSD FRML MDRD: >90 ML/MIN/1.73M2
GLUCOSE BLD-MCNC: 191 MG/DL (ref 70–125)
POTASSIUM BLD-SCNC: 4.5 MMOL/L (ref 3.5–5)
PROT SERPL-MCNC: 6.8 G/DL (ref 6–8)
SODIUM SERPL-SCNC: 139 MMOL/L (ref 136–145)

## 2022-01-20 PROCEDURE — 80053 COMPREHEN METABOLIC PANEL: CPT | Mod: ORL | Performed by: FAMILY MEDICINE

## 2022-06-01 ENCOUNTER — TELEPHONE (OUTPATIENT)
Dept: FAMILY MEDICINE | Facility: CLINIC | Age: 77
End: 2022-06-01
Payer: COMMERCIAL

## 2022-06-01 NOTE — TELEPHONE ENCOUNTER
Patient Quality Outreach    Patient is due for the following:   Diabetes -  A1C, Eye Exam, Microalbumin, BP Check and Diabetic Follow-Up Visit  Hypertension -  Hypertension follow-up visit  Physical  - none on file    NEXT STEPS:   Patient declined follow up at this time.    Type of outreach:    Phone, spoke to patient/parent. He hung up. Declines follow up at this time.      Questions for provider review:    None     Paulina Huang  Chart routed to Paulina Huang CMA.

## 2022-10-06 ENCOUNTER — LAB REQUISITION (OUTPATIENT)
Dept: LAB | Facility: CLINIC | Age: 77
End: 2022-10-06
Payer: COMMERCIAL

## 2022-10-06 DIAGNOSIS — D69.6 THROMBOCYTOPENIA, UNSPECIFIED (H): ICD-10-CM

## 2022-10-06 DIAGNOSIS — E78.49 OTHER HYPERLIPIDEMIA: ICD-10-CM

## 2022-10-06 LAB
ERYTHROCYTE [DISTWIDTH] IN BLOOD BY AUTOMATED COUNT: 13.1 % (ref 10–15)
HCT VFR BLD AUTO: 43 % (ref 40–53)
HGB BLD-MCNC: 15.2 G/DL (ref 13.3–17.7)
MCH RBC QN AUTO: 30.5 PG (ref 26.5–33)
MCHC RBC AUTO-ENTMCNC: 35.3 G/DL (ref 31.5–36.5)
MCV RBC AUTO: 86 FL (ref 78–100)
PLATELET # BLD AUTO: 179 10E3/UL (ref 150–450)
RBC # BLD AUTO: 4.98 10E6/UL (ref 4.4–5.9)
WBC # BLD AUTO: 6.6 10E3/UL (ref 4–11)

## 2022-10-06 PROCEDURE — 80061 LIPID PANEL: CPT | Mod: ORL | Performed by: FAMILY MEDICINE

## 2022-10-06 PROCEDURE — 85027 COMPLETE CBC AUTOMATED: CPT | Mod: ORL | Performed by: FAMILY MEDICINE

## 2022-10-07 LAB
CHOLEST SERPL-MCNC: 93 MG/DL
HDLC SERPL-MCNC: 36 MG/DL
LDLC SERPL CALC-MCNC: 43 MG/DL
NONHDLC SERPL-MCNC: 57 MG/DL
TRIGL SERPL-MCNC: 72 MG/DL

## 2023-01-16 ENCOUNTER — LAB REQUISITION (OUTPATIENT)
Dept: LAB | Facility: CLINIC | Age: 78
End: 2023-01-16
Payer: COMMERCIAL

## 2023-01-16 DIAGNOSIS — I10 ESSENTIAL (PRIMARY) HYPERTENSION: ICD-10-CM

## 2023-01-16 DIAGNOSIS — Z12.5 ENCOUNTER FOR SCREENING FOR MALIGNANT NEOPLASM OF PROSTATE: ICD-10-CM

## 2023-01-16 DIAGNOSIS — E11.65 TYPE 2 DIABETES MELLITUS WITH HYPERGLYCEMIA (H): ICD-10-CM

## 2023-01-16 LAB
ALBUMIN SERPL BCG-MCNC: 4.2 G/DL (ref 3.5–5.2)
ALP SERPL-CCNC: 107 U/L (ref 40–129)
ALT SERPL W P-5'-P-CCNC: 16 U/L (ref 10–50)
ANION GAP SERPL CALCULATED.3IONS-SCNC: 14 MMOL/L (ref 7–15)
AST SERPL W P-5'-P-CCNC: 17 U/L (ref 10–50)
BILIRUB SERPL-MCNC: 0.7 MG/DL
BUN SERPL-MCNC: 12.2 MG/DL (ref 8–23)
CALCIUM SERPL-MCNC: 9.2 MG/DL (ref 8.8–10.2)
CHLORIDE SERPL-SCNC: 107 MMOL/L (ref 98–107)
CREAT SERPL-MCNC: 0.72 MG/DL (ref 0.67–1.17)
CREAT UR-MCNC: 274 MG/DL
DEPRECATED HCO3 PLAS-SCNC: 21 MMOL/L (ref 22–29)
GFR SERPL CREATININE-BSD FRML MDRD: >90 ML/MIN/1.73M2
GLUCOSE SERPL-MCNC: 128 MG/DL (ref 70–99)
MICROALBUMIN UR-MCNC: 229 MG/L
MICROALBUMIN/CREAT UR: 83.58 MG/G CR (ref 0–17)
POTASSIUM SERPL-SCNC: 4.6 MMOL/L (ref 3.4–5.3)
PROT SERPL-MCNC: 6.3 G/DL (ref 6.4–8.3)
PSA SERPL-MCNC: 3.25 NG/ML (ref 0–6.5)
SODIUM SERPL-SCNC: 142 MMOL/L (ref 136–145)

## 2023-01-16 PROCEDURE — G0103 PSA SCREENING: HCPCS | Mod: ORL | Performed by: FAMILY MEDICINE

## 2023-01-16 PROCEDURE — 80053 COMPREHEN METABOLIC PANEL: CPT | Mod: ORL | Performed by: FAMILY MEDICINE

## 2023-01-16 PROCEDURE — 82570 ASSAY OF URINE CREATININE: CPT | Mod: ORL | Performed by: FAMILY MEDICINE

## 2024-01-23 ENCOUNTER — LAB REQUISITION (OUTPATIENT)
Dept: LAB | Facility: CLINIC | Age: 79
End: 2024-01-23
Payer: COMMERCIAL

## 2024-01-23 DIAGNOSIS — E78.49 OTHER HYPERLIPIDEMIA: ICD-10-CM

## 2024-01-23 DIAGNOSIS — I10 ESSENTIAL (PRIMARY) HYPERTENSION: ICD-10-CM

## 2024-01-23 DIAGNOSIS — E11.29 TYPE 2 DIABETES MELLITUS WITH OTHER DIABETIC KIDNEY COMPLICATION (H): ICD-10-CM

## 2024-01-23 DIAGNOSIS — D69.6 THROMBOCYTOPENIA, UNSPECIFIED (H): ICD-10-CM

## 2024-01-23 DIAGNOSIS — Z12.5 ENCOUNTER FOR SCREENING FOR MALIGNANT NEOPLASM OF PROSTATE: ICD-10-CM

## 2024-01-23 LAB
ALBUMIN SERPL BCG-MCNC: 4.2 G/DL (ref 3.5–5.2)
ALP SERPL-CCNC: 109 U/L (ref 40–150)
ALT SERPL W P-5'-P-CCNC: 21 U/L (ref 0–70)
ANION GAP SERPL CALCULATED.3IONS-SCNC: 9 MMOL/L (ref 7–15)
AST SERPL W P-5'-P-CCNC: 19 U/L (ref 0–45)
BILIRUB SERPL-MCNC: 0.8 MG/DL
BUN SERPL-MCNC: 18.7 MG/DL (ref 8–23)
CALCIUM SERPL-MCNC: 9.2 MG/DL (ref 8.8–10.2)
CHLORIDE SERPL-SCNC: 105 MMOL/L (ref 98–107)
CHOLEST SERPL-MCNC: 90 MG/DL
CREAT SERPL-MCNC: 0.76 MG/DL (ref 0.67–1.17)
CREAT UR-MCNC: 153 MG/DL
DEPRECATED HCO3 PLAS-SCNC: 26 MMOL/L (ref 22–29)
EGFRCR SERPLBLD CKD-EPI 2021: >90 ML/MIN/1.73M2
ERYTHROCYTE [DISTWIDTH] IN BLOOD BY AUTOMATED COUNT: 12.6 % (ref 10–15)
FASTING STATUS PATIENT QL REPORTED: ABNORMAL
GLUCOSE SERPL-MCNC: 141 MG/DL (ref 70–99)
HCT VFR BLD AUTO: 45.6 % (ref 40–53)
HDLC SERPL-MCNC: 33 MG/DL
HGB BLD-MCNC: 15.7 G/DL (ref 13.3–17.7)
LDLC SERPL CALC-MCNC: 41 MG/DL
MCH RBC QN AUTO: 30.5 PG (ref 26.5–33)
MCHC RBC AUTO-ENTMCNC: 34.4 G/DL (ref 31.5–36.5)
MCV RBC AUTO: 89 FL (ref 78–100)
MICROALBUMIN UR-MCNC: 258 MG/L
MICROALBUMIN/CREAT UR: 168.63 MG/G CR (ref 0–17)
NONHDLC SERPL-MCNC: 57 MG/DL
PLATELET # BLD AUTO: 198 10E3/UL (ref 150–450)
POTASSIUM SERPL-SCNC: 4.5 MMOL/L (ref 3.4–5.3)
PROT SERPL-MCNC: 6.9 G/DL (ref 6.4–8.3)
PSA SERPL DL<=0.01 NG/ML-MCNC: 3.53 NG/ML (ref 0–6.5)
RBC # BLD AUTO: 5.14 10E6/UL (ref 4.4–5.9)
SODIUM SERPL-SCNC: 140 MMOL/L (ref 135–145)
TRIGL SERPL-MCNC: 82 MG/DL
WBC # BLD AUTO: 8.8 10E3/UL (ref 4–11)

## 2024-01-23 PROCEDURE — G0103 PSA SCREENING: HCPCS | Mod: ORL | Performed by: FAMILY MEDICINE

## 2024-01-23 PROCEDURE — 82043 UR ALBUMIN QUANTITATIVE: CPT | Mod: ORL | Performed by: FAMILY MEDICINE

## 2024-01-23 PROCEDURE — 80061 LIPID PANEL: CPT | Mod: ORL | Performed by: FAMILY MEDICINE

## 2024-01-23 PROCEDURE — 80053 COMPREHEN METABOLIC PANEL: CPT | Mod: ORL | Performed by: FAMILY MEDICINE

## 2024-01-23 PROCEDURE — 85027 COMPLETE CBC AUTOMATED: CPT | Mod: ORL | Performed by: FAMILY MEDICINE

## 2024-01-25 ENCOUNTER — OFFICE VISIT (OUTPATIENT)
Dept: PHARMACY | Facility: PHYSICIAN GROUP | Age: 79
End: 2024-01-25
Payer: COMMERCIAL

## 2024-01-25 DIAGNOSIS — E11.40 TYPE 2 DIABETES MELLITUS WITH DIABETIC NEUROPATHY, WITH LONG-TERM CURRENT USE OF INSULIN (H): Primary | ICD-10-CM

## 2024-01-25 DIAGNOSIS — E78.2 MIXED HYPERLIPIDEMIA: ICD-10-CM

## 2024-01-25 DIAGNOSIS — I10 ESSENTIAL HYPERTENSION WITH GOAL BLOOD PRESSURE LESS THAN 130/85: ICD-10-CM

## 2024-01-25 DIAGNOSIS — I25.10 CORONARY ARTERY DISEASE INVOLVING NATIVE HEART WITHOUT ANGINA PECTORIS, UNSPECIFIED VESSEL OR LESION TYPE: ICD-10-CM

## 2024-01-25 DIAGNOSIS — Z79.4 TYPE 2 DIABETES MELLITUS WITH DIABETIC NEUROPATHY, WITH LONG-TERM CURRENT USE OF INSULIN (H): Primary | ICD-10-CM

## 2024-01-25 DIAGNOSIS — Z78.9 TAKES DIETARY SUPPLEMENTS: ICD-10-CM

## 2024-01-25 PROCEDURE — 99207 PR NO CHARGE LOS: CPT | Performed by: PHARMACIST

## 2024-01-25 NOTE — PROGRESS NOTES
Medication Therapy Management (MTM) Encounter    ASSESSMENT:                            Medication Adherence/Access: No issues identified    Diabetes: Patient is not meeting A1c goal of < 7%. Patient would benefit from starting continuous glucose monitoring. Patient is hesitant to add any extra medications for blood sugar lowering at this time.     Hypertension/CAD: Stable. Unclear if patient has continued DAPT, however he is not indicated for clopidogrel at this time. Cardiology advised to discontinue this after one year of therapy. Patient will confirm at home if he has been taking this and if so he will stop.     Hyperlipidemia: Stable.    Supplements: Stable.     PLAN:                            Continue taking your medications as prescribed.   Today we sent in a prescription for you to start using a Freestyle Breana CGM. This will track your blood sugars throughout the day.   Confirm if you are still taking Clopidogrel at home, if so, stop taking at this time.     Follow-up: Return in about 4 weeks (around 2/22/2024), or if symptoms worsen or fail to improve, for Follow up.    SUBJECTIVE/OBJECTIVE:                          Ho Adams is a 78 year old male coming in for an initial visit. He was referred to me from Dr. Cintron.      Reason for visit: Diabetes management.    Allergies/ADRs: Reviewed in chart  Past Medical History: Reviewed in chart  Tobacco: He reports that he has never smoked. He has never used smokeless tobacco.  Alcohol: rare    Medication Adherence/Access: no issues reported       Diabetes   Patient is in clinic today to discuss his diabetes management. Has had an elevated A1c for the past few years. Notes his activity levels vary greatly depending on the time of year. 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 In the summer months it can be a physically strenuous job, in the winter months he is less active. He does keep candy on him in case of low blood sugars while he is working.  Notes he can feel when they are starting to drop, and often becomes sleepy. We talked about diet and Ho notes since his wife  he has not eaten the best. He does not cook and will often have a frozen meal for dinner. Breakfast typically consists of a sweet roll and a cup of coffee. Tends to snack at night, often with a small piece of chocolate or a cup of ice cream. He is not wanting to make extreme dietary adjustments but is interested in wearing a CGM to see what his sugars are doing throughout the day. We talked about how a Freestyle Breana works and patient agreed to start using this. He is hesitant to add additional medications.     Lantus 28 units twice daily   Aspirin 81mg daily    Patient is not experiencing side effects.  Blood sugar monitorin time(s) daily; Ranges: (patient reported) Fasting- 100-150 mg/dl   Current diabetes symptoms: fatigue  Diet/Exercise:   Breakfast - sweet roll and coffee with cream  Lunch - might have fast food if he is working, sandwich for lunch   Dinner- meatloaf, chicken, salad,   Snacks at night - ice cream, chocolate      Eye exam in the last 12 months? No, patient has appt scheduled for early February.   Foot exam: up to date  Urine Albumin:   Lab Results   Component Value Date    UMALCR 168.63 (H) 2024      A1c:        Hypertension /CAD:  Patient has been managed by cardiology in the past, due to insurance changes he is in the process of establishing care with a new provider. He has an appointment scheduled in a few weeks. Denies issues or side effects with current medication regimen. Unclear if patient is still taking Clopidogrel, he did not bring his medications in with him, but will check once he is back home. Should not be continuing DAPT with Plavix at this time.   Metoprolol Tartrate 25 MG Tablet, 1/2 tablet by mouth twice daily   Losartan Potassium 100 MG Tablet, one by mouth daily    Isosorbide Mononitrate ER 30 MG Tablet one tablet every morning once  daily   Clopidogrel Bisulfate 75 MG Tablet, one by mouth daily (unclear if patient is still taking)    Patient reports no current medication side effects  Patient does not self-monitor blood pressure.           Pulse Readings from Last 3 Encounters:   02/19/20 73   02/10/20 79   01/13/20 74       Hyperlipidemia   Atorvastatin 40 mg daily  Patient reports no significant myalgias or other side effects.  The ASCVD Risk score (Arely DENNY, et al., 2019) failed to calculate for the following reasons:    The valid total cholesterol range is 130 to 320 mg/dL     Recent Labs   Lab Test 01/23/24  1053 10/06/22  0922   CHOL 90 93   HDL 33* 36*   LDL 41 43   TRIG 82 72     Supplements:   Taking for general health and denies side effects at this time.   Multivitamin one tablet by mouth daily       Today's Vitals: /80 (BP Location: Left arm)   Wt 192 lb 6.4 oz (87.3 kg)   BMI 28.41 kg/m    ----------------      I spent 70 minutes with this patient today. All changes were made via collaborative practice agreement with Jam Cintron MD. A copy of the visit note was provided to the patient's provider(s).    A summary of these recommendations was sent via [employer].    Miriam Auguste, PharmD  Medication Therapy Management Pharmacist     Medication Therapy Recommendations  Type 2 diabetes mellitus with diabetic neuropathy, with long-term current use of insulin (H)    Rationale: Medication requires monitoring - Needs additional monitoring   Recommendation: Self-Monitoring   Status: Accepted per CPA   Note: CGM

## 2024-01-29 VITALS — DIASTOLIC BLOOD PRESSURE: 80 MMHG | WEIGHT: 192.4 LBS | SYSTOLIC BLOOD PRESSURE: 118 MMHG | BODY MASS INDEX: 28.41 KG/M2

## 2024-01-29 RX ORDER — ATORVASTATIN CALCIUM 40 MG/1
40 TABLET, FILM COATED ORAL DAILY
COMMUNITY
Start: 2023-11-27

## 2024-01-29 RX ORDER — ISOSORBIDE MONONITRATE 30 MG/1
30 TABLET, EXTENDED RELEASE ORAL DAILY
COMMUNITY
Start: 2022-12-22

## 2024-01-29 RX ORDER — METOPROLOL TARTRATE 25 MG/1
25 TABLET, FILM COATED ORAL 2 TIMES DAILY
COMMUNITY
Start: 2023-08-07

## 2024-01-30 ENCOUNTER — TRANSFERRED RECORDS (OUTPATIENT)
Dept: MULTI SPECIALTY CLINIC | Facility: CLINIC | Age: 79
End: 2024-01-30

## 2024-01-30 LAB — RETINOPATHY: NORMAL

## 2024-01-30 NOTE — PATIENT INSTRUCTIONS
"Recommendations from today's MTM visit:                                                    MTM (medication therapy management) is a service provided by a clinical pharmacist designed to help you get the most of out of your medicines.   Today we reviewed what your medicines are for, how to know if they are working, that your medicines are safe and how to make your medicine regimen as easy as possible.      Continue taking your medications as prescribed.   Today we sent in a prescription for you to start using a Freestyle Breana CGM. This will track your blood sugars throughout the day.   Confirm if you are still taking Clopidogrel at home, if so, stop taking at this time.     Follow-up: Return in about 4 weeks (around 2/22/2024), or if symptoms worsen or fail to improve, for Follow up.    It was great speaking with you today.  I value your experience and would be very thankful for your time in providing feedback in our clinic survey. In the next few days, you may receive an email or text message from [facility] with a link to a survey related to your  clinical pharmacist.\"     To schedule another MTM appointment, please call the clinic directly or you may call the MTM scheduling line at [phone].    My Clinical Pharmacist's contact information:                                                      Please feel free to contact me with any questions or concerns you have.      Miriam Auguste, PharmD  Medication Therapy Management Pharmacist   "

## 2024-03-26 NOTE — PROGRESS NOTES
Medication Therapy Management (MTM) Encounter    ASSESSMENT:                            Medication Adherence/Access: No issues identified    Diabetes: Patient is not meeting A1c goal of < 7%. Patient would benefit from starting continuous glucose monitoring. Sample sensor was placed today in clinic. Patient is hesitant to add any extra medications for blood sugar lowering at this time.     Hypertension/CAD: Stable. Patient will be meeting with new cardiology team in May. Updated Metoprolol Tartrate prescription to one tablet by mouth twice daily.     PLAN:                            Continue taking your medications as prescribed.   Today we started a sample Freestyle Breana CGM. This will track your blood sugars throughout the day.     Follow-up: Return in 12 days (on 4/9/2024) for Follow up, with me, using a phone visit.    SUBJECTIVE/OBJECTIVE:                          Ho Adams is a 79 year old male coming in for a follow up visit. He was referred to me from Dr. Cintron.      Reason for visit: Diabetes management.    Allergies/ADRs: Reviewed in chart  Past Medical History: Reviewed in chart  Tobacco: He reports that he has never smoked. He has never used smokeless tobacco.  Alcohol: rare    Medication Adherence/Access: no issues reported       Diabetes   Patient is in clinic today to discuss his diabetes management. Has had an elevated A1c for the past few years. Notes his activity levels vary greatly depending on the time of year. 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 In the summer months it can be a physically strenuous job, in the winter months he is less active. He does keep candy on him in case of low blood sugars while he is working. Notes he can feel when they are starting to drop, and often becomes sleepy. Reports he had a low blood sugar episode over night about two weeks ago. He is interested in wearing a CGM to see what his sugars are doing throughout the day. We were able to get him set  up with a Breana 3 sample sensor today. He is hesitant to add additional medications at this time.     Lantus 28 units twice daily   Aspirin 81mg daily    Patient is not experiencing side effects.  Blood sugar monitorin time(s) daily; Ranges: (patient reported) Fasting- 100-150 mg/dl   Current diabetes symptoms: fatigue  Diet/Exercise:   Breakfast - sweet roll and coffee with cream  Lunch - might have fast food if he is working, sandwich for lunch   Dinner- meatloaf, chicken, salad,   Snacks at night - ice cream, chocolate      Eye exam in the last 12 months? No  Foot exam: up to date  Urine Albumin:   Lab Results   Component Value Date    UMALCR 168.63 (H) 2024      A1c:        Hypertension /CAD:  Patient has been managed by cardiology in the past, due to insurance changes he is in the process of establishing care with a new provider. He has an appointment scheduled at the end of May. Patient reports he has been taking his metoprolol as one tablet twice daily instead of the half tablet. Unclear where/ when this change was made. He has not experienced any side effects with the increased dose.     Metoprolol Tartrate 25 MG Tablet, 1 tablet by mouth twice daily   Losartan Potassium 100 MG Tablet, one by mouth daily    Isosorbide Mononitrate ER 30 MG Tablet one tablet every morning once daily     Patient reports no current medication side effects  Patient does not self-monitor blood pressure.               Today's Vitals: /86 (BP Location: Left arm)   Wt 196 lb 11.2 oz (89.2 kg)   BMI 29.05 kg/m    ----------------      I spent 45 minutes with this patient today. All changes were made via collaborative practice agreement with Jam Cintron MD. A copy of the visit note was provided to the patient's provider(s).    A summary of these recommendations was sent via clinic portal    Miriam Auguste PharmD  Medication Therapy Management Pharmacist     Medication Therapy Recommendations  Essential  hypertension with goal blood pressure less than 130/85    Current Medication: metoprolol tartrate (LOPRESSOR) 25 MG tablet   Rationale: Does not understand instructions - Adherence - Adherence   Recommendation: Change Medication   Status: Accepted per CPA   Note: Updated dose and rx

## 2024-03-28 ENCOUNTER — OFFICE VISIT (OUTPATIENT)
Dept: PHARMACY | Facility: PHYSICIAN GROUP | Age: 79
End: 2024-03-28
Payer: COMMERCIAL

## 2024-03-28 VITALS — WEIGHT: 196.7 LBS | DIASTOLIC BLOOD PRESSURE: 86 MMHG | SYSTOLIC BLOOD PRESSURE: 138 MMHG | BODY MASS INDEX: 29.05 KG/M2

## 2024-03-28 DIAGNOSIS — I25.10 CORONARY ARTERY DISEASE INVOLVING NATIVE HEART WITHOUT ANGINA PECTORIS, UNSPECIFIED VESSEL OR LESION TYPE: ICD-10-CM

## 2024-03-28 DIAGNOSIS — Z79.4 TYPE 2 DIABETES MELLITUS WITH DIABETIC NEUROPATHY, WITH LONG-TERM CURRENT USE OF INSULIN (H): Primary | ICD-10-CM

## 2024-03-28 DIAGNOSIS — E11.40 TYPE 2 DIABETES MELLITUS WITH DIABETIC NEUROPATHY, WITH LONG-TERM CURRENT USE OF INSULIN (H): Primary | ICD-10-CM

## 2024-03-28 DIAGNOSIS — I10 ESSENTIAL HYPERTENSION WITH GOAL BLOOD PRESSURE LESS THAN 130/85: ICD-10-CM

## 2024-03-28 PROCEDURE — 99207 PR NO CHARGE LOS: CPT | Performed by: PHARMACIST

## 2024-03-28 NOTE — PATIENT INSTRUCTIONS
"Recommendations from today's MTM visit:                                                         Continue taking your medications as prescribed.   Today we started a sample Freestyle Breana CGM. This will track your blood sugars throughout the day.     Follow-up: Return in 12 days (on 4/9/2024) for Follow up, with me, using a phone visit.    It was great speaking with you today.  I value your experience and would be very thankful for your time in providing feedback in our clinic survey. In the next few days, you may receive an email or text message from [employer] with a link to a survey related to your  clinical pharmacist.\"     To schedule another MTM appointment, please call the clinic directly or you may call the MTM scheduling line at [phone].    My Clinical Pharmacist's contact information:                                                      Please feel free to contact me with any questions or concerns you have.      Miriam Auguste, PharmD  Medication Therapy Management Pharmacist   "

## 2024-04-09 ENCOUNTER — OFFICE VISIT (OUTPATIENT)
Dept: PHARMACY | Facility: PHYSICIAN GROUP | Age: 79
End: 2024-04-09
Payer: COMMERCIAL

## 2024-04-09 DIAGNOSIS — E11.40 TYPE 2 DIABETES MELLITUS WITH DIABETIC NEUROPATHY, WITH LONG-TERM CURRENT USE OF INSULIN (H): Primary | ICD-10-CM

## 2024-04-09 DIAGNOSIS — Z79.4 TYPE 2 DIABETES MELLITUS WITH DIABETIC NEUROPATHY, WITH LONG-TERM CURRENT USE OF INSULIN (H): Primary | ICD-10-CM

## 2024-04-09 PROCEDURE — 99207 PR NO CHARGE LOS: CPT | Performed by: PHARMACIST

## 2024-04-09 NOTE — PATIENT INSTRUCTIONS
"Recommendations from today's MTM visit:                                                       Continuing wearing the Freestyle Breana 3 sensor to track your blood sugars.   Try to increase your protein intake during the day and reduce carbs.     Follow-up: Return in 4 weeks (on 5/7/2024) for Follow up, with me.    It was great speaking with you today.  I value your experience and would be very thankful for your time in providing feedback in our clinic survey. In the next few days, you may receive an email or text message from [employer] with a link to a survey related to your  clinical pharmacist.\"     To schedule another MTM appointment, please call the clinic directly or you may call the MTM scheduling line at [phone].    My Clinical Pharmacist's contact information:                                                      Please feel free to contact me with any questions or concerns you have.      Miriam Auguste, PharmD  Medication Therapy Management Pharmacist   "

## 2024-04-09 NOTE — PROGRESS NOTES
Medication Therapy Management (MTM) Encounter    ASSESSMENT:                            Medication Adherence/Access: No issues identified    Diabetes: Patient is not meeting A1c goal of < 7%. Patient is willing to continue using the CGM . Patient is hesitant to add any extra medications for blood sugar lowering at this time. Plan to focus on dietary and lifestyle adjustments in combination with the CGM data.     PLAN:                            Continuing wearing the Freestyle Breana 3 sensor to track your blood sugars.   Try to increase your protein intake during the day and reduce carbs.     Follow-up: Return in 4 weeks (on 5/7/2024) for Follow up, with me.    SUBJECTIVE/OBJECTIVE:                          Ho Adams is a 79 year old male called for a follow up visit. He was referred to me from Dr. Cintron.      Reason for visit: Diabetes management.    Allergies/ADRs: Reviewed in chart  Past Medical History: Reviewed in chart  Tobacco: He reports that he has never smoked. He has never used smokeless tobacco.  Alcohol: rare    Medication Adherence/Access: no issues reported        Diabetes   Since our last visit Ho has been wearing a breana sensor. Reports he feels the readings are not as accurate. We did go over his Breana report and compared to his manual checks. Also noted that his meter is several years old and may have accuracy errors. He is ok with continuing his breana at this time. Would like to work on dietary changes instead of adding a medication. Did briefly discuss benefit of adding an SGLT-2, however patient is not interested at this time.     Lantus 28 units twice daily   Aspirin 81mg daily    Patient is not experiencing side effects.  Blood sugar monitoring: see breana report below.     Current diabetes symptoms: fatigue  Diet/Exercise:   Breakfast - sweet roll and coffee with cream  Lunch - might have fast food if he is working, sandwich for lunch   Dinner- meatloaf, chicken, salad,   Snacks at night -  ice cream, chocolate      Eye exam in the last 12 months? No  Foot exam: up to date  Urine Albumin:   Lab Results   Component Value Date    UMALCR 168.63 (H) 01/23/2024      A1c:            Today's Vitals: There were no vitals taken for this visit.  ----------------      I spent 35 minutes with this patient today. All changes were made via collaborative practice agreement with Jam Cintron MD. A copy of the visit note was provided to the patient's provider(s).    A summary of these recommendations was sent via clinic portal    Miriam Auguste PharmD  Medication Therapy Management Pharmacist       Medication Therapy Recommendations  Type 2 diabetes mellitus with diabetic neuropathy, with long-term current use of insulin (H)    Current Medication: insulin glargine (LANTUS SOLOSTAR) 100 UNIT/ML pen   Rationale: Synergistic therapy - Needs additional medication therapy - Indication   Recommendation: Start Medication   Status: Declined per Patient

## 2024-05-06 NOTE — PROGRESS NOTES
Medication Therapy Management (MTM) Encounter    ASSESSMENT:                            Medication Adherence/Access: No issues identified    Diabetes: Patient is not meeting A1c goal of < 7%. However, he has made improvement since last A1c check at the beginning of the year. Assisted patient in setting up his CGM sensor again in clinic. Patient would like to continue lifestyle changes at this time and does not want to adjust medications for blood sugar control.     PLAN:                            Continuing wearing the Freestyle Breana 3 sensor to track your blood sugars.   Try to increase your protein intake during the day and reduce carbs. Having a small snack with protein in the late morning and afternoon may help keep you full through the day. Consider having hard boiled eggs, cottage cheese or half of a protein shake.     Follow-up: Return in 3 months (on 8/1/2024) for Follow up, with me.    SUBJECTIVE/OBJECTIVE:                          Ho Adams is a 79 year old male coming for a follow up visit. He was referred to me from Dr. Cintron.      Reason for visit: Diabetes management.    Allergies/ADRs: Reviewed in chart  Past Medical History: Reviewed in chart  Tobacco: He reports that he has never smoked. He has never used smokeless tobacco.  Alcohol: rare    Medication Adherence/Access: no issues reported        Diabetes   Since our last visit Ho has not been wearing his CGM, but he has been checking fasting sugars manually. Assisted patient in starting his next sensor in clinic. He will be using the reader instead of his phone. Reports he has not made any major changes except drinking a protein shake every day. Feels this give him more energy throughout the day. He would like to continue with lifestyle changes at this time and does not want to adjust his medications at this time.      Lantus 28 units twice daily   Aspirin 81mg daily  Freestyle Breana 3 sensor    Patient is not experiencing side effects.  Blood  sugar monitorin-130 mg/dl in the morning     Current diabetes symptoms: fatigue  Diet/Exercise: Patient has been drinking more protein shakes and feels this has been beneficial.   Breakfast - sweet roll and coffee with cream  Lunch - might have fast food if he is working, sandwich for lunch   Dinner- meatloaf, chicken, salad,      Eye exam in the last 12 months? Yes- Date of last eye exam: 24,  Location: [facility]    Foot exam: up to date  Urine Albumin:   Lab Results   Component Value Date    UMALCR 168.63 (H) 2024      A1c:  24 9.6%  1.23.24 10.8%        Today's Vitals: /70 (BP Location: Left arm)   Pulse 66   Wt 198 lb (89.8 kg)   BMI 29.24 kg/m    ----------------      I spent 30 minutes with this patient today. All changes were made via collaborative practice agreement with Jam Cintron MD. A copy of the visit note was provided to the patient's provider(s).    A summary of these recommendations was sent via clinic portal    Miriam Auguste PharmD  Medication Therapy Management Pharmacist       Medication Therapy Recommendations  No medication therapy recommendations to display

## 2024-05-07 ENCOUNTER — OFFICE VISIT (OUTPATIENT)
Dept: PHARMACY | Facility: PHYSICIAN GROUP | Age: 79
End: 2024-05-07
Payer: COMMERCIAL

## 2024-05-07 VITALS
SYSTOLIC BLOOD PRESSURE: 136 MMHG | HEART RATE: 66 BPM | DIASTOLIC BLOOD PRESSURE: 70 MMHG | WEIGHT: 198 LBS | BODY MASS INDEX: 29.24 KG/M2

## 2024-05-07 DIAGNOSIS — Z79.4 TYPE 2 DIABETES MELLITUS WITH DIABETIC NEUROPATHY, WITH LONG-TERM CURRENT USE OF INSULIN (H): Primary | ICD-10-CM

## 2024-05-07 DIAGNOSIS — E11.40 TYPE 2 DIABETES MELLITUS WITH DIABETIC NEUROPATHY, WITH LONG-TERM CURRENT USE OF INSULIN (H): Primary | ICD-10-CM

## 2024-05-07 PROCEDURE — 99207 PR NO CHARGE LOS: CPT | Performed by: PHARMACIST

## 2024-05-07 NOTE — PATIENT INSTRUCTIONS
"Recommendations from today's MTM visit:                                                         Continuing wearing the Freestyle Breana 3 sensor to track your blood sugars.   Try to increase your protein intake during the day and reduce carbs. Having a small snack with protein in the late morning and afternoon may help keep you full through the day. Consider having hard boiled eggs, cottage cheese or half of a protein shake.     Follow-up: Return in 3 months (on 8/1/2024) for Follow up, with me.    It was great speaking with you today.  I value your experience and would be very thankful for your time in providing feedback in our clinic survey. In the next few days, you may receive an email or text message from [employer] with a link to a survey related to your  clinical pharmacist.\"     To schedule another MTM appointment, please call the clinic directly or you may call the MTM scheduling line at [phone].    My Clinical Pharmacist's contact information:                                                      Please feel free to contact me with any questions or concerns you have.      Miriam Auguste, PharmD  Medication Therapy Management Pharmacist   "

## 2024-08-01 ENCOUNTER — OFFICE VISIT (OUTPATIENT)
Dept: PHARMACY | Facility: PHYSICIAN GROUP | Age: 79
End: 2024-08-01

## 2024-08-01 VITALS — WEIGHT: 193.2 LBS | BODY MASS INDEX: 28.53 KG/M2 | SYSTOLIC BLOOD PRESSURE: 136 MMHG | DIASTOLIC BLOOD PRESSURE: 78 MMHG

## 2024-08-01 DIAGNOSIS — E11.40 TYPE 2 DIABETES MELLITUS WITH DIABETIC NEUROPATHY, WITH LONG-TERM CURRENT USE OF INSULIN (H): Primary | ICD-10-CM

## 2024-08-01 DIAGNOSIS — Z79.4 TYPE 2 DIABETES MELLITUS WITH DIABETIC NEUROPATHY, WITH LONG-TERM CURRENT USE OF INSULIN (H): Primary | ICD-10-CM

## 2024-08-01 PROCEDURE — 99207 PR NO CHARGE LOS: CPT | Performed by: PHARMACIST

## 2024-08-01 RX ORDER — BLOOD-GLUCOSE SENSOR
EACH MISCELLANEOUS
COMMUNITY

## 2024-08-01 NOTE — PROGRESS NOTES
Medication Therapy Management (MTM) Encounter    ASSESSMENT:                            Medication Adherence/Access: No issues identified    Diabetes: Patient is not meeting A1c goal of < 7%. Recommend increasing Lantus dose and having patient utilize Breana sensor more consistently.     PLAN:                            Increase your Lantus to 32 units twice daily.   Continuing wearing the Freestyle Breana 3 sensor to track your blood sugars. Be sure to keep your reader with you during the day.   Try to increase your protein intake during the day and reduce carbs.     Follow-up: Return in 9 weeks (on 10/3/2024) for Follow up, with me.    SUBJECTIVE/OBJECTIVE:                          Ho Adams is a 79 year old male coming for a follow up visit. He was referred to me from Dr. Cintron.      Reason for visit: Diabetes management.    Allergies/ADRs: Reviewed in chart  Past Medical History: Reviewed in chart  Tobacco: He reports that he has never smoked. He has never used smokeless tobacco.  Alcohol: rare    Medication Adherence/Access: no issues reported      Diabetes   Since our last visit Ho has not been wearing his CGM consistently. We talked about how important it is to wearing the sensor and keeping the reader with him when he is out of the house. He recently had dental work completed which has really impacted his diet. He has been eating much less than normal and when he does he it is mostly chicken noodle soup, a little salad, coffee. Follow up dental appt is next week. With the pain from his tooth, he has not been active at all, resting at home the past several days. He also has not been drinking his protein shakes as he ran out. Patient reports he will  more at the store today.     Lantus 28 units twice daily   Aspirin 81mg daily  Freestyle Breana 3 sensor    Patient is not experiencing side effects.  Blood sugar monitoring:   Name: Ho Adams  YOB: 1945  Report Period: 07/19/2024 -  08/01/2024 (14 days)  Generated: 08/01/2024  % Time CGM Active: 14%      Glucose Statistics and Targets  Average Glucose: 295 mg/dL  Glucose Management Indicator (GMI): N/A  Glucose Variability (%CV): 18.3%  Target Range: 70 - 180 mg/dL      Time in Ranges  Very High: >250 mg/dL --- 71%  High: 181 - 250 mg/dL --- 29%  Target Range: 70 - 180 mg/dL --- 0%  Low: 54 - 69 mg/dL --- 0%  Very Low: <54 mg/dL --- 0%      Current diabetes symptoms: fatigue  Diet/Exercise: Very sedentary the last week due to tooth pain. Patient has not been drinking protein shakes, eating mostly chicken noodle soup.      Eye exam in the last 12 months? Yes- Date of last eye exam: 1/30/24,  Location: [facility]    Foot exam: up to date  Urine Albumin:   Lab Results   Component Value Date    UMALCR 168.63 (H) 01/23/2024      A1c:  5/6/24 9.6%  1.23.24 10.8%        Today's Vitals: /78   Wt 193 lb 3.2 oz (87.6 kg)   BMI 28.53 kg/m    ----------------      I spent 40 minutes with this patient today. All changes were made via collaborative practice agreement with Jam Cintron MD. A copy of the visit note was provided to the patient's provider(s).    A summary of these recommendations was sent via clinic portal    Miriam Auguste PharmD  Medication Therapy Management Pharmacist       Medication Therapy Recommendations  Type 2 diabetes mellitus with diabetic neuropathy, with long-term current use of insulin (H)    Current Medication: Continuous Glucose Sensor (FREESTYLE SAJI 3 SENSOR) MISC   Rationale: Does not understand instructions - Adherence - Adherence   Recommendation: Provide Education   Status: Patient Agreed - Adherence/Education          Current Medication: insulin glargine (LANTUS SOLOSTAR) 100 UNIT/ML pen   Rationale: Dose too low - Dosage too low - Effectiveness   Recommendation: Increase Dose   Status: Accepted per CPA

## 2024-08-01 NOTE — PATIENT INSTRUCTIONS
"Recommendations from today's MTM visit:                                                       Increase your Lantus to 32 units twice daily.   Continuing wearing the Freestyle Breana 3 sensor to track your blood sugars. Be sure to keep your reader with you during the day.   Try to increase your protein intake during the day and reduce carbs.     Follow-up: Return in 9 weeks (on 10/3/2024) for Follow up, with me.    It was great speaking with you today.  I value your experience and would be very thankful for your time in providing feedback in our clinic survey. In the next few days, you may receive an email or text message from [employer] with a link to a survey related to your  clinical pharmacist.\"     To schedule another MTM appointment, please call the clinic directly or you may call the MTM scheduling line at [phone].    My Clinical Pharmacist's contact information:                                                      Please feel free to contact me with any questions or concerns you have.      Miriam Auguste, PharmD  Medication Therapy Management Pharmacist    "

## 2024-08-15 ENCOUNTER — LAB REQUISITION (OUTPATIENT)
Dept: LAB | Facility: CLINIC | Age: 79
End: 2024-08-15
Payer: COMMERCIAL

## 2024-08-15 DIAGNOSIS — E11.29 TYPE 2 DIABETES MELLITUS WITH OTHER DIABETIC KIDNEY COMPLICATION (H): ICD-10-CM

## 2024-08-15 DIAGNOSIS — I10 ESSENTIAL (PRIMARY) HYPERTENSION: ICD-10-CM

## 2024-08-15 DIAGNOSIS — E11.65 TYPE 2 DIABETES MELLITUS WITH HYPERGLYCEMIA (H): ICD-10-CM

## 2024-08-15 LAB
ALBUMIN SERPL BCG-MCNC: 4.1 G/DL (ref 3.5–5.2)
ALP SERPL-CCNC: 100 U/L (ref 40–150)
ALT SERPL W P-5'-P-CCNC: 14 U/L (ref 0–70)
ANION GAP SERPL CALCULATED.3IONS-SCNC: 10 MMOL/L (ref 7–15)
AST SERPL W P-5'-P-CCNC: 16 U/L (ref 0–45)
BILIRUB SERPL-MCNC: 0.6 MG/DL
BUN SERPL-MCNC: 18.9 MG/DL (ref 8–23)
CALCIUM SERPL-MCNC: 9.2 MG/DL (ref 8.8–10.4)
CHLORIDE SERPL-SCNC: 105 MMOL/L (ref 98–107)
CREAT SERPL-MCNC: 0.81 MG/DL (ref 0.67–1.17)
EGFRCR SERPLBLD CKD-EPI 2021: >90 ML/MIN/1.73M2
GLUCOSE SERPL-MCNC: 169 MG/DL (ref 70–99)
HCO3 SERPL-SCNC: 25 MMOL/L (ref 22–29)
POTASSIUM SERPL-SCNC: 4.8 MMOL/L (ref 3.4–5.3)
PROT SERPL-MCNC: 6.5 G/DL (ref 6.4–8.3)
SODIUM SERPL-SCNC: 140 MMOL/L (ref 135–145)

## 2024-08-15 PROCEDURE — 80053 COMPREHEN METABOLIC PANEL: CPT | Mod: ORL | Performed by: FAMILY MEDICINE

## 2024-10-02 NOTE — PROGRESS NOTES
Medication Therapy Management (MTM) Encounter    ASSESSMENT:                            Medication Adherence/Access: No issues identified    Diabetes: Patient is not meeting A1c goal of < 7%. Recommend increasing Lantus dose and having patient utilize Breana sensor more consistently. Due to patient's daily routine, it would be challenging to have him start meal time insulin. Patient would like to continue with just long acting insulin at this time. Will discuss further at follow up visit. Could consider adding meal time dose at evening meal.     PLAN:                            Increase your Lantus to 34 units twice daily.   Continuing wearing the Freestyle Breana 3 sensor to track your blood sugars. Be sure to keep your reader with you during the day.   You have made great changes with your diet, keep up this good work!     Follow-up: Return in about 4 weeks (around 10/31/2024) for Follow up, with me.    SUBJECTIVE/OBJECTIVE:                          Ho Adams is a 79 year old male coming for a follow up visit. He was referred to me from Dr. Cintron.      Reason for visit: Diabetes management.    Allergies/ADRs: Reviewed in chart  Past Medical History: Reviewed in chart  Tobacco: He reports that he has never smoked. He has never used smokeless tobacco.  Alcohol: rare    Medication Adherence/Access: no issues reported      Diabetes   Since our last visit Ho has been wearing his sensor and is feeling much better. The infection in his teeth have resolved as well. Notes one episode of low blood sugar over the past few weeks but admits he had not eaten all day when this occurred. Patient has also started having eggs and a starch for breakfast almost every morning and protein shakes during the day. We were unable to download a full breana report today in clinic, but reviewing his reader with the past several days it appears he is still running high (averaging 200 mg/dl most of the time). Will make small adjustment with  insulin today.       Lantus 32 units twice daily   Aspirin 81mg daily  Freestyle Breana 3 sensor    Patient is not experiencing side effects.  Blood sugar monitoring:  Sensor report not available today.      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 Eggs for breakfast and occasional roll. Protein shakes during the day and frozen meal for dinner.      Eye exam in the last 12 months? Yes- Date of last eye exam: 1/30/24,  Location: [facility]    Foot exam: up to date      Today's Vitals: /68   Wt 197 lb (89.4 kg)   BMI 29.09 kg/m    ----------------    I spent 30 minutes with this patient today. All changes were made via collaborative practice agreement with Jam Cintron MD. A copy of the visit note was provided to the patient's provider(s).    A summary of these recommendations was sent via clinic portal    Miriam Auguste PharmD  Medication Therapy Management Pharmacist       Medication Therapy Recommendations  Type 2 diabetes mellitus with diabetic neuropathy, with long-term current use of insulin (H)    Current Medication: insulin glargine (LANTUS SOLOSTAR) 100 UNIT/ML pen   Rationale: Dose too low - Dosage too low - Effectiveness   Recommendation: Increase Dose   Status: Accepted per CPA

## 2024-10-03 ENCOUNTER — OFFICE VISIT (OUTPATIENT)
Dept: PHARMACY | Facility: PHYSICIAN GROUP | Age: 79
End: 2024-10-03
Payer: COMMERCIAL

## 2024-10-03 VITALS — BODY MASS INDEX: 29.09 KG/M2 | DIASTOLIC BLOOD PRESSURE: 68 MMHG | WEIGHT: 197 LBS | SYSTOLIC BLOOD PRESSURE: 120 MMHG

## 2024-10-03 DIAGNOSIS — Z79.4 TYPE 2 DIABETES MELLITUS WITH DIABETIC NEUROPATHY, WITH LONG-TERM CURRENT USE OF INSULIN (H): Primary | ICD-10-CM

## 2024-10-03 DIAGNOSIS — E11.40 TYPE 2 DIABETES MELLITUS WITH DIABETIC NEUROPATHY, WITH LONG-TERM CURRENT USE OF INSULIN (H): Primary | ICD-10-CM

## 2024-10-03 PROCEDURE — 99207 PR NO CHARGE LOS: CPT | Performed by: PHARMACIST

## 2024-10-03 NOTE — PATIENT INSTRUCTIONS
"Recommendations from today's MTM visit:                                                       Increase your Lantus to 34 units twice daily.   Continuing wearing the Freestyle Breana 3 sensor to track your blood sugars. Be sure to keep your reader with you during the day.   You have made great changes with your diet, keep up this good work!     Follow-up: Return in about 4 weeks (around 10/31/2024) for Follow up, with me.    It was great speaking with you today.  I value your experience and would be very thankful for your time in providing feedback in our clinic survey. In the next few days, you may receive an email or text message from [employer] with a link to a survey related to your  clinical pharmacist.\"     To schedule another MTM appointment, please call the clinic directly or you may call the MTM scheduling line at [phone].    My Clinical Pharmacist's contact information:                                                      Please feel free to contact me with any questions or concerns you have.      Miriam Auguste, PharmD  Medication Therapy Management Pharmacist    "

## 2024-11-19 ENCOUNTER — OFFICE VISIT (OUTPATIENT)
Dept: PHARMACY | Facility: PHYSICIAN GROUP | Age: 79
End: 2024-11-19
Payer: COMMERCIAL

## 2024-11-19 VITALS — WEIGHT: 198.4 LBS | DIASTOLIC BLOOD PRESSURE: 84 MMHG | SYSTOLIC BLOOD PRESSURE: 124 MMHG | BODY MASS INDEX: 29.3 KG/M2

## 2024-11-19 DIAGNOSIS — Z79.4 TYPE 2 DIABETES MELLITUS WITH DIABETIC NEUROPATHY, WITH LONG-TERM CURRENT USE OF INSULIN (H): Primary | ICD-10-CM

## 2024-11-19 DIAGNOSIS — E11.40 TYPE 2 DIABETES MELLITUS WITH DIABETIC NEUROPATHY, WITH LONG-TERM CURRENT USE OF INSULIN (H): Primary | ICD-10-CM

## 2024-11-19 PROCEDURE — 99207 PR NO CHARGE LOS: CPT | Performed by: PHARMACIST

## 2024-11-19 NOTE — PROGRESS NOTES
Medication Therapy Management (MTM) Encounter    ASSESSMENT:                            Medication Adherence/Access: No issues identified    Diabetes: Patient is not meeting A1c goal of < 7%. However, his A1c has improved greatly since our last visit. Patient is agreeable to starting an SGLT-2 at this time over meal time insulin. Will have patient start Jardiance 10 mg tablet by mouth daily and recheck BMP in 2-4 weeks.     PLAN:                            Your blood sugars have improved greatly since our last visit, keep up this good work!   Begin taking once daily Jardiance 10 mg tablet by mouth in the morning.   We will recheck your labs in 2-4 weeks once you start the medication.     Follow-up: Return in about 4 weeks (around 2024) for Follow up, with me.    SUBJECTIVE/OBJECTIVE:                          Ho Adams is a 79 year old male coming for a follow up visit. He was referred to me from Dr. Cintron.      Reason for visit: Diabetes management.    Allergies/ADRs: Reviewed in chart  Past Medical History: Reviewed in chart  Tobacco: He reports that he has never smoked. He has never used smokeless tobacco.  Alcohol: rare    Medication Adherence/Access: no issues reported      Diabetes   Since our last visit Ho reports things have been going very well. Notes he is still working on reducing his carbs and has been eating more eggs in the morning. 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 Denies any low blood sugar episodes. We did talk about starting meal time insulin vs an SGLT-2. Patient would prefer an oral option at this time. Reviewed potential side effects and expected effect of new medication.     Lantus 34 units twice daily   Aspirin 81mg daily  Freestyle Breana 3 sensor    Patient is not experiencing side effects.  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 Eggs for breakfast and occasional roll. Protein shakes during the day and frozen meal for dinner.      Eye exam in the last 12 months? Yes- Date of last eye exam: 1/30/24,  Location: [facility]    Foot exam: up to date      Today's Vitals: /84   Wt 198 lb 6.4 oz (90 kg)   BMI 29.30 kg/m    ----------------    I spent 35 minutes with this patient today. All changes were made via collaborative practice agreement with Jam Cintron MD. A copy of the visit note was provided to the patient's provider(s).    A summary of these recommendations was sent via clinic portal    Miriam Auguste PharmD  Medication Therapy Management Pharmacist       Medication Therapy Recommendations  Type 2 diabetes mellitus with diabetic neuropathy, with long-term current use of insulin (H)   1 Current Medication: insulin glargine (LANTUS PEN) 100 UNIT/ML pen   Current Medication Sig: Inject 34 Units subcutaneously 2 times daily.   Rationale: Synergistic therapy - Needs additional medication therapy - Indication   Recommendation: Start Medication   Status: Accepted per CPA   Identified Date: 11/19/2024 Completed Date: 11/19/2024

## 2024-11-19 NOTE — PATIENT INSTRUCTIONS
"Recommendations from today's MTM visit:                                                       Your blood sugars have improved greatly since our last visit, keep up this good work!   Begin taking once daily Jardiance 10 mg tablet by mouth in the morning.   We will recheck your labs in 2-4 weeks once you start the medication.     Follow-up: Return in about 4 weeks (around 12/17/2024) for Follow up, with me.    It was great speaking with you today.  I value your experience and would be very thankful for your time in providing feedback in our clinic survey. In the next few days, you may receive an email or text message from [employer] with a link to a survey related to your  clinical pharmacist.\"     To schedule another MTM appointment, please call the clinic directly or you may call the MTM scheduling line at [phone].    My Clinical Pharmacist's contact information:                                                      Please feel free to contact me with any questions or concerns you have.      Miriam Auguste, PharmD  Medication Therapy Management Pharmacist    "

## 2025-01-15 NOTE — PROGRESS NOTES
Medication Therapy Management (MTM) Encounter    ASSESSMENT:                            Medication Adherence/Access: No issues identified    Diabetes:   Patient is not meeting A1c goal of < 7%. Patient would benefit from starting Jardiance at this time. This was discussed at last visit, however patient has not begun taking the medication yet. Plan for BMP 2-4 weeks after starting medication.     Hypertension/CAD:   Stable.     Hyperlipidemia:   Stable.    Supplements:   Stable.     PLAN:                            Begin taking once daily Jardiance 10 mg by mouth every morning.   We will recheck your labs in two weeks to make sure the medication is safe and effective.   Continue taking all other medications as prescribed at this time.      Follow-up: Return in 19 days (on 2/4/2025) for Follow up Labs.    SUBJECTIVE/OBJECTIVE:                          Ho Adams is a 79 year old male seen for a follow up visit. He was referred to me from Dr. Cintron.      Reason for visit: Diabetes management, yearly med review.    Allergies/ADRs: Reviewed in chart  Past Medical History: Reviewed in chart  Tobacco: He reports that he has never smoked. He has never used smokeless tobacco.  Alcohol: rare    Medication Adherence/Access: no issues reported        Diabetes   Patient reports he has not started the Jardiance we discussed at our last visit. Notes he was on vacation and wanted to wait for the new year. Reviewed the medication and side effects with the patient again. He has continued taking his insulin twice daily. Continues wearing CGM, but does state it often falls off before 14 days.     Jardiance 10 mg tablet by mouth daily (Not taking)  Lantus 34 units twice daily   Aspirin 81mg daily    Patient is not experiencing side effects.  Blood sugar monitoring: CGM  Ranges: (patient reported from his Breana meter) Fasting- 100-150 mg/dl   Current diabetes symptoms: fatigue  Diet/Exercise:   Breakfast - protein shake and coffee with  cream  Lunch - might have fast food if he is working, sandwich for lunch   Dinner- meatloaf, chicken, salad,   Snacks at night - ice cream, chocolate      Eye exam in the last 12 months? Yes- Date of last eye exam: 1/30/24,  Location: [facility]  Foot exam: up to date  Urine Albumin:   Lab Results   Component Value Date    UMALCR 168.63 (H) 01/23/2024      A1c:  11/19/2024 9.2%      Hypertension /CAD:  Metoprolol Tartrate 25 MG Tablet, 1 tablet by mouth twice daily   Losartan Potassium 100 MG Tablet, one by mouth daily    Isosorbide Mononitrate ER 30 MG Tablet one tablet every morning once daily   Working with cardiology for management.   Patient reports no current medication side effects  Patient does not self-monitor blood pressure.           Hyperlipidemia   Atorvastatin 40 mg daily  Patient reports no significant myalgias or other side effects.  The ASCVD Risk score (Arely DK, et al., 2019) failed to calculate for the following reasons:    The valid total cholesterol range is 130 to 320 mg/dL       Supplements:   Taking for general health and denies side effects at this time.   Multivitamin one tablet by mouth daily       Today's Vitals: There were no vitals taken for this visit.  ----------------      I spent 17 minutes with this patient today. All changes were made via collaborative practice agreement with Jam Cintron MD. A copy of the visit note was provided to the patient's provider(s).    A summary of these recommendations was sent via [employer].    Dahlia RomanD  Medication Therapy Management Pharmacist      Telemedicine Visit Details  The patient's medications can be safely assessed via a telemedicine encounter.  Type of service:  Telephone visit  Originating Location (pt. Location): Home    Distant Location (provider location):  On-site  Start Time: 8:58 AM  End Time: 9:15 AM     Medication Therapy Recommendations  Type 2 diabetes mellitus with diabetic neuropathy, with long-term  current use of insulin (H)   1 Current Medication: JARDIANCE 10 MG TABS tablet   Current Medication Sig: Take 10 mg by mouth daily.   Rationale: Does not understand instructions - Adherence - Adherence   Recommendation: Provide Education   Status: Patient Agreed - Adherence/Education   Identified Date: 1/16/2025 Completed Date: 1/16/2025

## 2025-01-16 ENCOUNTER — VIRTUAL VISIT (OUTPATIENT)
Dept: PHARMACY | Facility: PHYSICIAN GROUP | Age: 80
End: 2025-01-16
Payer: COMMERCIAL

## 2025-01-16 DIAGNOSIS — I10 ESSENTIAL HYPERTENSION WITH GOAL BLOOD PRESSURE LESS THAN 130/85: ICD-10-CM

## 2025-01-16 DIAGNOSIS — Z78.9 TAKES DIETARY SUPPLEMENTS: ICD-10-CM

## 2025-01-16 DIAGNOSIS — E78.2 MIXED HYPERLIPIDEMIA: ICD-10-CM

## 2025-01-16 DIAGNOSIS — I25.10 CORONARY ARTERY DISEASE INVOLVING NATIVE HEART WITHOUT ANGINA PECTORIS, UNSPECIFIED VESSEL OR LESION TYPE: ICD-10-CM

## 2025-01-16 DIAGNOSIS — E11.40 TYPE 2 DIABETES MELLITUS WITH DIABETIC NEUROPATHY, WITH LONG-TERM CURRENT USE OF INSULIN (H): Primary | ICD-10-CM

## 2025-01-16 DIAGNOSIS — Z79.4 TYPE 2 DIABETES MELLITUS WITH DIABETIC NEUROPATHY, WITH LONG-TERM CURRENT USE OF INSULIN (H): Primary | ICD-10-CM

## 2025-01-16 RX ORDER — EMPAGLIFLOZIN 10 MG/1
10 TABLET, FILM COATED ORAL DAILY
COMMUNITY

## 2025-01-16 NOTE — PATIENT INSTRUCTIONS
"Recommendations from today's MTM visit:                                                    MTM (medication therapy management) is a service provided by a clinical pharmacist designed to help you get the most of out of your medicines.   Today we reviewed what your medicines are for, how to know if they are working, that your medicines are safe and how to make your medicine regimen as easy as possible.      Begin taking once daily Jardiance 10 mg by mouth every morning.   We will recheck your labs in two weeks to make sure the medication is safe and effective.   Continue taking all other medications as prescribed at this time.     Follow-up: Return in 19 days (on 2/4/2025) for Follow up Labs.    It was great speaking with you today.  I value your experience and would be very thankful for your time in providing feedback in our clinic survey. In the next few days, you may receive an email or text message from [employer] with a link to a survey related to your  clinical pharmacist.\"     To schedule another MTM appointment, please call the clinic directly or you may call the MTM scheduling line at [phone].    My Clinical Pharmacist's contact information:                                                      Please feel free to contact me with any questions or concerns you have.      Miriam Auguste, PharmD  Medication Therapy Management Pharmacist    "

## 2025-02-04 ENCOUNTER — LAB REQUISITION (OUTPATIENT)
Dept: LAB | Facility: CLINIC | Age: 80
End: 2025-02-04
Payer: COMMERCIAL

## 2025-02-04 DIAGNOSIS — E11.65 TYPE 2 DIABETES MELLITUS WITH HYPERGLYCEMIA (H): ICD-10-CM

## 2025-02-04 LAB
ANION GAP SERPL CALCULATED.3IONS-SCNC: 12 MMOL/L (ref 7–15)
BUN SERPL-MCNC: 15.4 MG/DL (ref 8–23)
CALCIUM SERPL-MCNC: 9.3 MG/DL (ref 8.8–10.4)
CHLORIDE SERPL-SCNC: 104 MMOL/L (ref 98–107)
CREAT SERPL-MCNC: 0.84 MG/DL (ref 0.67–1.17)
EGFRCR SERPLBLD CKD-EPI 2021: 90 ML/MIN/1.73M2
GLUCOSE SERPL-MCNC: 152 MG/DL (ref 70–99)
HCO3 SERPL-SCNC: 24 MMOL/L (ref 22–29)
POTASSIUM SERPL-SCNC: 5.1 MMOL/L (ref 3.4–5.3)
SODIUM SERPL-SCNC: 140 MMOL/L (ref 135–145)

## 2025-02-10 NOTE — PROGRESS NOTES
Medication Therapy Management (MTM) Encounter    ASSESSMENT:                            Medication Adherence/Access: No issues identified    Diabetes:   Patient is not meeting A1c goal of < 7%. However, since starting Jardiance his blood sugars have trended down nicely. He is seeing his PCP next week for an A1c check and will be scheduling his yearly eye exam shortly.     PLAN:                            Continue taking your medications as prescribed until your next visit with Dr. Cintron.   Schedule your yearly eye exam today.     Follow-up: Return in 10 days (on 2/21/2025) for Follow up with Dr. Cintron.    SUBJECTIVE/OBJECTIVE:                          Ho Adams is a 79 year old male seen for a follow up visit. He was referred to me from Dr. Cintron.      Reason for visit: Diabetes management.    Allergies/ADRs: Reviewed in chart  Past Medical History: Reviewed in chart  Tobacco: He reports that he has never smoked. He has never used smokeless tobacco.  Alcohol: rare    Medication Adherence/Access: no issues reported        Diabetes   Patient denies side effects since starting Jardiance, his blood sugars have trended down. Recent BMP check shows good kidney function. Insulin dose was decreased last week due to a few recurrent low blood sugar episodes. Patient has not had any lows since decreasing insulin.     Jardiance 10 mg tablet by mouth daily   Lantus 30 units twice daily   Aspirin 81mg daily  Freestyle hemanth 3 sensor and reader     Patient is not experiencing side effects.  Blood sugar monitoring: CGM  Name: Ho Adams  YOB: 1945  Report Period: 01/29/2025 - 02/11/2025 (14 days)  Generated: 02/11/2025  Time CGM Active: 89%      Glucose Statistics and Targets  Average Glucose: 178 mg/dL  Glucose Management Indicator (GMI): 7.6%  Glucose Variability (%CV): 35.2%  Target Range: 70 - 180 mg/dL      Time in Ranges  Very High: >250 mg/dL --- 14%  High: 181 - 250 mg/dL --- 31%  Target Range: 70 - 180  mg/dL --- 53%  Low: 54 - 69 mg/dL --- 2%  Very Low: <54 mg/dL --- 0%    Current diabetes symptoms: fatigue  Diet/Exercise:   Breakfast - protein shake and coffee with cream  Lunch - might have fast food if he is working, sandwich for lunch   Dinner- meatloaf, chicken, salad,   Snacks at night -  occasional ice cream, chocolate      Eye exam in the last 12 months? No  Foot exam: up to date      Today's Vitals: /78   Wt 194 lb (88 kg)   BMI 28.65 kg/m    ----------------      I spent 30 minutes with this patient today. All changes were made via collaborative practice agreement with Jam Cintron MD. A copy of the visit note was provided to the patient's provider(s).    A summary of these recommendations was sent via [employer].    Miriam Auguste, PharmD  Medication Therapy Management Pharmacist       Medication Therapy Recommendations  Type 2 diabetes mellitus with diabetic neuropathy, with long-term current use of insulin (H)   1 Current Medication: insulin glargine (LANTUS PEN) 100 UNIT/ML pen   Current Medication Sig: Inject 30 Units subcutaneously 2 times daily.   Rationale: Medication requires monitoring - Needs additional monitoring   Recommendation: Referral to Service    Status: Patient Agreed - Adherence/Education   Identified Date: 2/11/2025 Completed Date: 2/11/2025   Note: yearly eye exam

## 2025-02-11 ENCOUNTER — OFFICE VISIT (OUTPATIENT)
Dept: PHARMACY | Facility: PHYSICIAN GROUP | Age: 80
End: 2025-02-11
Payer: COMMERCIAL

## 2025-02-11 VITALS — WEIGHT: 194 LBS | SYSTOLIC BLOOD PRESSURE: 134 MMHG | BODY MASS INDEX: 28.65 KG/M2 | DIASTOLIC BLOOD PRESSURE: 78 MMHG

## 2025-02-11 DIAGNOSIS — Z79.4 TYPE 2 DIABETES MELLITUS WITH DIABETIC NEUROPATHY, WITH LONG-TERM CURRENT USE OF INSULIN (H): Primary | ICD-10-CM

## 2025-02-11 DIAGNOSIS — E11.40 TYPE 2 DIABETES MELLITUS WITH DIABETIC NEUROPATHY, WITH LONG-TERM CURRENT USE OF INSULIN (H): Primary | ICD-10-CM

## 2025-02-11 PROCEDURE — 99207 PR NO CHARGE LOS: CPT | Performed by: PHARMACIST

## 2025-02-11 NOTE — PATIENT INSTRUCTIONS
"Recommendations from today's MTM visit:                                                       Continue taking your medications as prescribed until your next visit with Dr. Cintron.   Schedule your yearly eye exam today.     Follow-up: Return in 10 days (on 2/21/2025) for Follow up with Dr. Cintron.    It was great speaking with you today.  I value your experience and would be very thankful for your time in providing feedback in our clinic survey. In the next few days, you may receive an email or text message from [employer] with a link to a survey related to your  clinical pharmacist.\"     To schedule another MTM appointment, please call the clinic directly or you may call the MTM scheduling line at [phone].    My Clinical Pharmacist's contact information:                                                      Please feel free to contact me with any questions or concerns you have.      Miriam Auguste, PharmD  Medication Therapy Management Pharmacist    "

## 2025-02-21 ENCOUNTER — LAB REQUISITION (OUTPATIENT)
Dept: LAB | Facility: CLINIC | Age: 80
End: 2025-02-21
Payer: COMMERCIAL

## 2025-02-21 DIAGNOSIS — E11.65 TYPE 2 DIABETES MELLITUS WITH HYPERGLYCEMIA (H): ICD-10-CM

## 2025-02-21 DIAGNOSIS — I25.119 ATHEROSCLEROTIC HEART DISEASE OF NATIVE CORONARY ARTERY WITH UNSPECIFIED ANGINA PECTORIS: ICD-10-CM

## 2025-02-21 DIAGNOSIS — Z00.00 ENCOUNTER FOR GENERAL ADULT MEDICAL EXAMINATION WITHOUT ABNORMAL FINDINGS: ICD-10-CM

## 2025-02-21 LAB
ANION GAP SERPL CALCULATED.3IONS-SCNC: 13 MMOL/L (ref 7–15)
BUN SERPL-MCNC: 14.4 MG/DL (ref 8–23)
CALCIUM SERPL-MCNC: 9.4 MG/DL (ref 8.8–10.4)
CHLORIDE SERPL-SCNC: 103 MMOL/L (ref 98–107)
CHOLEST SERPL-MCNC: 93 MG/DL
CREAT SERPL-MCNC: 0.74 MG/DL (ref 0.67–1.17)
CREAT UR-MCNC: 183 MG/DL
EGFRCR SERPLBLD CKD-EPI 2021: >90 ML/MIN/1.73M2
FASTING STATUS PATIENT QL REPORTED: NO
FASTING STATUS PATIENT QL REPORTED: NO
GLUCOSE SERPL-MCNC: 265 MG/DL (ref 70–99)
HCO3 SERPL-SCNC: 24 MMOL/L (ref 22–29)
HDLC SERPL-MCNC: 31 MG/DL
LDLC SERPL CALC-MCNC: 39 MG/DL
MICROALBUMIN UR-MCNC: 450 MG/L
MICROALBUMIN/CREAT UR: 245.9 MG/G CR (ref 0–17)
NONHDLC SERPL-MCNC: 62 MG/DL
POTASSIUM SERPL-SCNC: 4.9 MMOL/L (ref 3.4–5.3)
PSA SERPL DL<=0.01 NG/ML-MCNC: 4.39 NG/ML (ref 0–6.5)
SODIUM SERPL-SCNC: 140 MMOL/L (ref 135–145)
TRIGL SERPL-MCNC: 113 MG/DL

## 2025-02-21 PROCEDURE — 80048 BASIC METABOLIC PNL TOTAL CA: CPT | Mod: ORL | Performed by: FAMILY MEDICINE

## 2025-02-21 PROCEDURE — 82043 UR ALBUMIN QUANTITATIVE: CPT | Mod: ORL | Performed by: FAMILY MEDICINE

## 2025-02-21 PROCEDURE — G0103 PSA SCREENING: HCPCS | Mod: ORL | Performed by: FAMILY MEDICINE

## 2025-02-21 PROCEDURE — 82570 ASSAY OF URINE CREATININE: CPT | Mod: ORL | Performed by: FAMILY MEDICINE

## 2025-02-21 PROCEDURE — 80061 LIPID PANEL: CPT | Mod: ORL | Performed by: FAMILY MEDICINE

## 2025-05-30 ENCOUNTER — TRANSFERRED RECORDS (OUTPATIENT)
Dept: MULTI SPECIALTY CLINIC | Facility: CLINIC | Age: 80
End: 2025-05-30

## 2025-05-30 LAB — RETINOPATHY: NORMAL

## 2025-07-07 NOTE — PROGRESS NOTES
Medication Therapy Management (MTM) Encounter    ASSESSMENT:                            Medication Adherence/Access: No issues identified    Diabetes:   Patient is not meeting A1c goal of < 7%. Last two weeks have shown improvement in blood sugar control. Would benefit from increasing Jardiance dose to 25 mg.     PLAN:                            Today we increased your Jardiance from 10 mg to 25 mg daily.   Focus on increasing protein during the day and reducing carb/sugar intake.     Follow-up: Return in about 4 weeks (around 8/5/2025) for Follow up, with PCP.    SUBJECTIVE/OBJECTIVE:                          Ho Adams is a 80 year old male seen for a follow up visit. He was referred to me from Dr. Cintron.      Reason for visit: Diabetes management.    Allergies/ADRs: Reviewed in chart  Past Medical History: Reviewed in chart  Tobacco: He reports that he has never smoked. He has never used smokeless tobacco.  Alcohol: rare    Medication Adherence/Access: no issues reported      Diabetes   Patient denies side effects or issues with current regimen. 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 He is worried about having lows while working. We did talk about how his blood sugar average is higher than we would like to see. Patient is hesitant to make further changes to his insulin.     Jardiance 10 mg tablet by mouth daily   Lantus 30 units twice daily   Aspirin 81mg daily  Freestyle hemanth 3 sensor and reader     Patient is not experiencing side effects.  Blood sugar monitoring: CGM  Name: Ho Adams  YOB: 1945  Report Period: 06/25/2025 - 07/08/2025 (14 days)  Generated: 07/08/2025  Time CGM Active: 96%      Glucose Statistics and Targets  Average Glucose: 233 mg/dL  Glucose Management Indicator (GMI): 8.9%  Glucose Variability (%CV): 29.6%  Target Range: 70 - 180 mg/dL      Time in Ranges  Very High: >250 mg/dL --- 41%  High: 181 - 250 mg/dL ---  34%  Target Range: 70 - 180 mg/dL --- 25%  Low: 54 - 69 mg/dL --- 0%  Very Low: <54 mg/dL --- 0%      Current diabetes symptoms: fatigue  Diet/Exercise:   Breakfast - protein shake and coffee with cream  Lunch - might have fast food if he is working, sandwich for lunch   Dinner- meatloaf, chicken, salad,   Snacks at night -  occasional ice cream, chocolate      Eye exam in the last 12 months? Yes- Date of last eye exam: 5/30/25,  Location: [facility]  Foot exam: up to date      Today's Vitals: /70   Wt 195 lb 9.6 oz (88.7 kg)   BMI 28.89 kg/m    ----------------      I spent 30 minutes with this patient today. All changes were made via collaborative practice agreement with Jam Cintron MD. A copy of the visit note was provided to the patient's provider(s).    A summary of these recommendations was sent via [employer].    Miriam Auguste, PharmD  Medication Therapy Management Pharmacist       Medication Therapy Recommendations  Type 2 diabetes mellitus with diabetic neuropathy, with long-term current use of insulin (H)   1 Current Medication: JARDIANCE 10 MG TABS tablet   Current Medication Sig: Take 10 mg by mouth daily.   Rationale: Dose too low - Dosage too low - Effectiveness   Recommendation: Increase Dose   Status: Accepted per CPA   Identified Date: 7/8/2025 Completed Date: 7/8/2025

## 2025-07-08 ENCOUNTER — OFFICE VISIT (OUTPATIENT)
Dept: PHARMACY | Facility: PHYSICIAN GROUP | Age: 80
End: 2025-07-08
Payer: COMMERCIAL

## 2025-07-08 VITALS — WEIGHT: 195.6 LBS | SYSTOLIC BLOOD PRESSURE: 136 MMHG | DIASTOLIC BLOOD PRESSURE: 70 MMHG | BODY MASS INDEX: 28.89 KG/M2

## 2025-07-08 DIAGNOSIS — E11.40 TYPE 2 DIABETES MELLITUS WITH DIABETIC NEUROPATHY, WITH LONG-TERM CURRENT USE OF INSULIN (H): Primary | ICD-10-CM

## 2025-07-08 DIAGNOSIS — Z79.4 TYPE 2 DIABETES MELLITUS WITH DIABETIC NEUROPATHY, WITH LONG-TERM CURRENT USE OF INSULIN (H): Primary | ICD-10-CM

## 2025-07-08 PROCEDURE — 99207 PR NO CHARGE LOS: CPT | Performed by: PHARMACIST

## 2025-07-08 NOTE — PATIENT INSTRUCTIONS
"Recommendations from today's MTM visit:                                                       Today we increased your Jardiance from 10 mg to 25 mg daily.   Focus on increasing protein during the day and reducing carb/sugar intake.     Follow-up: Return in about 4 weeks (around 8/5/2025) for Follow up, with PCP.    It was great speaking with you today.  I value your experience and would be very thankful for your time in providing feedback in our clinic survey. In the next few days, you may receive an email or text message from [employer] with a link to a survey related to your  clinical pharmacist.\"     To schedule another MTM appointment, please call the clinic directly or you may call the MTM scheduling line at [phone].    My Clinical Pharmacist's contact information:                                                      Please feel free to contact me with any questions or concerns you have.      Miriam Auguste, PharmD  Medication Therapy Management Pharmacist    "

## 2025-08-18 ENCOUNTER — LAB REQUISITION (OUTPATIENT)
Dept: LAB | Facility: CLINIC | Age: 80
End: 2025-08-18
Payer: COMMERCIAL